# Patient Record
Sex: FEMALE | Race: BLACK OR AFRICAN AMERICAN | NOT HISPANIC OR LATINO
[De-identification: names, ages, dates, MRNs, and addresses within clinical notes are randomized per-mention and may not be internally consistent; named-entity substitution may affect disease eponyms.]

---

## 2017-06-26 ENCOUNTER — APPOINTMENT (OUTPATIENT)
Dept: OTHER | Facility: CLINIC | Age: 62
End: 2017-06-26

## 2017-06-26 VITALS
OXYGEN SATURATION: 95 % | HEIGHT: 68 IN | SYSTOLIC BLOOD PRESSURE: 130 MMHG | DIASTOLIC BLOOD PRESSURE: 82 MMHG | BODY MASS INDEX: 40.92 KG/M2 | WEIGHT: 270 LBS | HEART RATE: 74 BPM

## 2017-06-27 LAB
ALBUMIN SERPL ELPH-MCNC: 4.4 G/DL
ALP BLD-CCNC: 47 U/L
ALT SERPL-CCNC: 14 U/L
ANION GAP SERPL CALC-SCNC: 15 MMOL/L
APPEARANCE: CLEAR
AST SERPL-CCNC: 12 U/L
BASOPHILS # BLD AUTO: 0.04 K/UL
BASOPHILS NFR BLD AUTO: 0.8 %
BILIRUB SERPL-MCNC: 0.6 MG/DL
BILIRUBIN URINE: NEGATIVE
BLOOD URINE: NEGATIVE
BUN SERPL-MCNC: 12 MG/DL
CALCIUM SERPL-MCNC: 9.2 MG/DL
CHLORIDE SERPL-SCNC: 101 MMOL/L
CHOLEST SERPL-MCNC: 204 MG/DL
CHOLEST/HDLC SERPL: 3.4 RATIO
CO2 SERPL-SCNC: 25 MMOL/L
COLOR: YELLOW
CREAT SERPL-MCNC: 0.74 MG/DL
EOSINOPHIL # BLD AUTO: 0.12 K/UL
EOSINOPHIL NFR BLD AUTO: 2.3 %
GLUCOSE QUALITATIVE U: NORMAL MG/DL
GLUCOSE SERPL-MCNC: 114 MG/DL
HCT VFR BLD CALC: 39.9 %
HDLC SERPL-MCNC: 60 MG/DL
HGB BLD-MCNC: 12.3 G/DL
IMM GRANULOCYTES NFR BLD AUTO: 0.2 %
KETONES URINE: NEGATIVE
LDLC SERPL CALC-MCNC: 123 MG/DL
LEUKOCYTE ESTERASE URINE: NEGATIVE
LYMPHOCYTES # BLD AUTO: 1.95 K/UL
LYMPHOCYTES NFR BLD AUTO: 38.1 %
MAN DIFF?: NORMAL
MCHC RBC-ENTMCNC: 25.5 PG
MCHC RBC-ENTMCNC: 30.8 GM/DL
MCV RBC AUTO: 82.8 FL
MONOCYTES # BLD AUTO: 0.31 K/UL
MONOCYTES NFR BLD AUTO: 6.1 %
NEUTROPHILS # BLD AUTO: 2.69 K/UL
NEUTROPHILS NFR BLD AUTO: 52.5 %
NITRITE URINE: NEGATIVE
PH URINE: 5.5
PLATELET # BLD AUTO: 236 K/UL
POTASSIUM SERPL-SCNC: 4.4 MMOL/L
PROT SERPL-MCNC: 7 G/DL
PROTEIN URINE: NEGATIVE MG/DL
RBC # BLD: 4.82 M/UL
RBC # FLD: 15.3 %
SODIUM SERPL-SCNC: 141 MMOL/L
SPECIFIC GRAVITY URINE: 1.02
TRIGL SERPL-MCNC: 107 MG/DL
UROBILINOGEN URINE: NORMAL MG/DL
WBC # FLD AUTO: 5.12 K/UL

## 2018-06-05 ENCOUNTER — APPOINTMENT (OUTPATIENT)
Dept: OTHER | Facility: CLINIC | Age: 63
End: 2018-06-05
Payer: COMMERCIAL

## 2018-06-05 VITALS
WEIGHT: 270 LBS | HEART RATE: 88 BPM | SYSTOLIC BLOOD PRESSURE: 117 MMHG | RESPIRATION RATE: 16 BRPM | DIASTOLIC BLOOD PRESSURE: 83 MMHG | HEIGHT: 68 IN | BODY MASS INDEX: 40.92 KG/M2 | OXYGEN SATURATION: 95 %

## 2018-06-05 PROCEDURE — 99214 OFFICE O/P EST MOD 30 MIN: CPT | Mod: 25

## 2018-06-05 PROCEDURE — 96150: CPT

## 2018-06-05 PROCEDURE — 94010 BREATHING CAPACITY TEST: CPT

## 2018-06-05 PROCEDURE — 99396 PREV VISIT EST AGE 40-64: CPT

## 2018-06-06 LAB
ALBUMIN SERPL ELPH-MCNC: 4.3 G/DL
ALP BLD-CCNC: 51 U/L
ALT SERPL-CCNC: 12 U/L
ANION GAP SERPL CALC-SCNC: 15 MMOL/L
APPEARANCE: CLEAR
AST SERPL-CCNC: 12 U/L
BACTERIA: NEGATIVE
BASOPHILS # BLD AUTO: 0.04 K/UL
BASOPHILS NFR BLD AUTO: 0.6 %
BILIRUB SERPL-MCNC: 0.5 MG/DL
BILIRUBIN URINE: NEGATIVE
BLOOD URINE: NEGATIVE
BUN SERPL-MCNC: 12 MG/DL
CALCIUM SERPL-MCNC: 9.4 MG/DL
CHLORIDE SERPL-SCNC: 101 MMOL/L
CHOLEST SERPL-MCNC: 211 MG/DL
CHOLEST/HDLC SERPL: 4.1 RATIO
CO2 SERPL-SCNC: 25 MMOL/L
COLOR: YELLOW
CREAT SERPL-MCNC: 0.87 MG/DL
EOSINOPHIL # BLD AUTO: 0.09 K/UL
EOSINOPHIL NFR BLD AUTO: 1.3 %
GLUCOSE QUALITATIVE U: NEGATIVE MG/DL
GLUCOSE SERPL-MCNC: 111 MG/DL
HCT VFR BLD CALC: 40.7 %
HDLC SERPL-MCNC: 52 MG/DL
HGB BLD-MCNC: 11.9 G/DL
HYALINE CASTS: 0 /LPF
IMM GRANULOCYTES NFR BLD AUTO: 0.1 %
KETONES URINE: NEGATIVE
LDLC SERPL CALC-MCNC: 126 MG/DL
LEUKOCYTE ESTERASE URINE: NEGATIVE
LYMPHOCYTES # BLD AUTO: 2.47 K/UL
LYMPHOCYTES NFR BLD AUTO: 35.9 %
MAN DIFF?: NORMAL
MCHC RBC-ENTMCNC: 24.3 PG
MCHC RBC-ENTMCNC: 29.2 GM/DL
MCV RBC AUTO: 83.1 FL
MICROSCOPIC-UA: NORMAL
MONOCYTES # BLD AUTO: 0.46 K/UL
MONOCYTES NFR BLD AUTO: 6.7 %
NEUTROPHILS # BLD AUTO: 3.81 K/UL
NEUTROPHILS NFR BLD AUTO: 55.4 %
NITRITE URINE: NEGATIVE
PH URINE: 6
PLATELET # BLD AUTO: 268 K/UL
POTASSIUM SERPL-SCNC: 4.4 MMOL/L
PROT SERPL-MCNC: 7.2 G/DL
PROTEIN URINE: NEGATIVE MG/DL
RBC # BLD: 4.9 M/UL
RBC # FLD: 15.1 %
RED BLOOD CELLS URINE: 0 /HPF
SODIUM SERPL-SCNC: 141 MMOL/L
SPECIFIC GRAVITY URINE: 1.02
SQUAMOUS EPITHELIAL CELLS: 4 /HPF
TRIGL SERPL-MCNC: 164 MG/DL
UROBILINOGEN URINE: NEGATIVE MG/DL
WBC # FLD AUTO: 6.88 K/UL
WHITE BLOOD CELLS URINE: 1 /HPF

## 2018-06-08 ENCOUNTER — RX RENEWAL (OUTPATIENT)
Age: 63
End: 2018-06-08

## 2018-08-10 ENCOUNTER — RX RENEWAL (OUTPATIENT)
Age: 63
End: 2018-08-10

## 2018-08-14 ENCOUNTER — RX RENEWAL (OUTPATIENT)
Age: 63
End: 2018-08-14

## 2018-08-29 ENCOUNTER — TRANSCRIPTION ENCOUNTER (OUTPATIENT)
Age: 63
End: 2018-08-29

## 2018-10-01 ENCOUNTER — RX RENEWAL (OUTPATIENT)
Age: 63
End: 2018-10-01

## 2018-10-03 ENCOUNTER — RX RENEWAL (OUTPATIENT)
Age: 63
End: 2018-10-03

## 2018-10-12 ENCOUNTER — RX RENEWAL (OUTPATIENT)
Age: 63
End: 2018-10-12

## 2019-02-25 ENCOUNTER — RX RENEWAL (OUTPATIENT)
Age: 64
End: 2019-02-25

## 2019-04-24 ENCOUNTER — FORM ENCOUNTER (OUTPATIENT)
Age: 64
End: 2019-04-24

## 2019-05-14 ENCOUNTER — APPOINTMENT (OUTPATIENT)
Dept: PULMONOLOGY | Facility: CLINIC | Age: 64
End: 2019-05-14

## 2019-06-18 ENCOUNTER — APPOINTMENT (OUTPATIENT)
Dept: PULMONOLOGY | Facility: CLINIC | Age: 64
End: 2019-06-18
Payer: COMMERCIAL

## 2019-06-18 VITALS
BODY MASS INDEX: 39.56 KG/M2 | DIASTOLIC BLOOD PRESSURE: 85 MMHG | HEIGHT: 68 IN | SYSTOLIC BLOOD PRESSURE: 132 MMHG | OXYGEN SATURATION: 95 % | WEIGHT: 261 LBS | TEMPERATURE: 98.3 F | HEART RATE: 76 BPM

## 2019-06-18 PROCEDURE — 99203 OFFICE O/P NEW LOW 30 MIN: CPT | Mod: 25

## 2019-06-18 PROCEDURE — 94729 DIFFUSING CAPACITY: CPT

## 2019-06-18 PROCEDURE — 94726 PLETHYSMOGRAPHY LUNG VOLUMES: CPT

## 2019-06-18 PROCEDURE — ZZZZZ: CPT

## 2019-06-18 PROCEDURE — 94010 BREATHING CAPACITY TEST: CPT

## 2019-06-18 NOTE — PHYSICAL EXAM
[General Appearance - Well Developed] : well developed [Normal Appearance] : normal appearance [Well Groomed] : well groomed [General Appearance - Well Nourished] : well nourished [No Deformities] : no deformities [General Appearance - In No Acute Distress] : no acute distress [Normal Conjunctiva] : the conjunctiva exhibited no abnormalities [Normal Oropharynx] : normal oropharynx [Neck Appearance] : the appearance of the neck was normal [Neck Cervical Mass (___cm)] : no neck mass was observed [Jugular Venous Distention Increased] : there was no jugular-venous distention [Heart Rate And Rhythm] : heart rate and rhythm were normal [Heart Sounds] : normal S1 and S2 [Murmurs] : no murmurs present [Edema] : no peripheral edema present [Arterial Pulses Normal] : the arterial pulses were normal [Respiration, Rhythm And Depth] : normal respiratory rhythm and effort [Exaggerated Use Of Accessory Muscles For Inspiration] : no accessory muscle use [Auscultation Breath Sounds / Voice Sounds] : lungs were clear to auscultation bilaterally [Bowel Sounds] : normal bowel sounds [Abdomen Soft] : soft [Abdomen Tenderness] : non-tender [Abnormal Walk] : normal gait [] : no hepato-splenomegaly [Nail Clubbing] : no clubbing of the fingernails [Cyanosis, Localized] : no localized cyanosis [Skin Color & Pigmentation] : normal skin color and pigmentation [Skin Turgor] : normal skin turgor [No Focal Deficits] : no focal deficits [Oriented To Time, Place, And Person] : oriented to person, place, and time [Impaired Insight] : insight and judgment were intact

## 2019-06-18 NOTE — PROCEDURE
[FreeTextEntry1] : PFTs- Spirometry shows possible small airway disease which is a nonspecific finding. Lung volumes are normal. DLCO is normal. No sig change compared to complete PFTs from 2014.

## 2019-06-24 ENCOUNTER — APPOINTMENT (OUTPATIENT)
Dept: OTHER | Facility: CLINIC | Age: 64
End: 2019-06-24
Payer: COMMERCIAL

## 2019-06-24 VITALS
SYSTOLIC BLOOD PRESSURE: 135 MMHG | HEART RATE: 100 BPM | RESPIRATION RATE: 18 BRPM | TEMPERATURE: 97.5 F | HEIGHT: 68 IN | OXYGEN SATURATION: 96 % | BODY MASS INDEX: 40.01 KG/M2 | WEIGHT: 264 LBS | DIASTOLIC BLOOD PRESSURE: 78 MMHG

## 2019-06-24 PROCEDURE — 99396 PREV VISIT EST AGE 40-64: CPT | Mod: 25

## 2019-06-24 PROCEDURE — 99214 OFFICE O/P EST MOD 30 MIN: CPT | Mod: 25

## 2019-06-24 NOTE — HISTORY OF PRESENT ILLNESS
[FreeTextEntry1] : 62 yo female, retired from [employer], works as massage therapist \par \par \par  she uses maintenance and rescue inhaler\par  also needs to take nasal sprays and antihistamine to control allergic  rhinitis  that exacerbated asthma Sx \par \par \par NO ER visits in the past 10 years\par She was seeing Dr Rodriguez Pulmonologist since before 09 11 2001 \par  recently evaluated by Dr Frazier 06 18 2019\par had PFT- mild restriction \par her cough and wheezing is better \par used  Xopenex HFA 3 times in the past few months, it does not cause her to be jittery \par Pulmicort Flexhaler once a day \par Interval Symptoms: denies lower extremity edema, denies chest pain, denies fever, but stable dyspnea on exertion, stable exercise intolerance, stable coughing, stable wheezing. \par \par \par \par Interval Triggers: cold weather and pollen exposure, infections, animal dander. \par \par She does snores loudly at night but denies nighttime awakening, denies being sleepy during daytime.\par She denies GERD, triggers such as pets, roaches, mold. \par \par \par She quit smoking 20 years ago, smoked 15-17 years, about 0.5 PPD. She currently works as a massage therapist.\par \par Colonoscopy age 54 yo, next due at age 62 yo- will refer \par Mammogram she reports being up to date  \par Pap smear- reported that  had it done this year by her GYN \par \par Spirometry : restriction, no change  \par Exposure History \par WTC effort on 9/13/2001-05 2002 12 hours a day assigned to the [employer]. 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 She was stationed [facility] THE [facility] AND [facility]. \par \par

## 2019-06-24 NOTE — DISCUSSION/SUMMARY
[Patient seen for WTC Monitoring ___] : Patient was seen for WTC monitoring [unfilled] [Please See Note in Chart and Documentation in Trial DB] : Please see note in chart and documentation in Trial DB. [FreeTextEntry3] : 62 yo female, retired from [place] \par \par \par \par She quit smoking 20  years ago, smoked 15-17 years, about 0.5 PPD). She currently works as a massage therapist.\par \par Colonoscopy age 54 yo \par Mammogram scheduled next month \par Pap smear had it done as per pt report \par \par \par Ms. Disla states that she began on the [facility] effort on 9/13/2001-05 2002 12 hours a day she was assigned to the [employer]. 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 She was stationed [facility]AC[facility] TO THE [facility] AND [facility]. \par \par ROS in trial DB \par PE in Trial DB \par Spirometry: Restriction, mild on last PFT \par A/P: Annual [facility] MV7\par CXR referral \par  labs  [FreeTextEntry1] : Patient seen face to face by SW for 15 minutes. Patient is here for her annual monitoring. Patient is certified with asthma. [facility] reviewed and data entered in TRIAL DB. Patient denied symptoms of PTSD, anxiety and depression related to her 9/11 efforts. Patient denied having a mental health history. No evidence of risk. SW did provide benefits counseling. Patient knows to call here with questions and concerns. Contact card provided.

## 2019-06-24 NOTE — HISTORY OF PRESENT ILLNESS
[FreeTextEntry1] : 64 yo female, retired from [employer], works as massage therapist \par \par \par  she uses maintenance and rescue inhaler\par  also needs to take nasal sprays and antihistamine to control allergic  rhinitis  that exacerbated asthma Sx \par \par \par NO ER visits in the past 10 years\par She was seeing Dr Rodriguez Pulmonologist since before 09 11 2001 \par  recently evaluated by Dr Frazier 06 18 2019\par had PFT- mild restriction \par her cough and wheezing is better \par used  Xopenex HFA 3 times in the past few months, it does not cause her to be jittery \par Pulmicort Flexhaler once a day \par Interval Symptoms: denies lower extremity edema, denies chest pain, denies fever, but stable dyspnea on exertion, stable exercise intolerance, stable coughing, stable wheezing. \par \par \par \par Interval Triggers: cold weather and pollen exposure, infections, animal dander. \par \par She does snores loudly at night but denies nighttime awakening, denies being sleepy during daytime.\par She denies GERD, triggers such as pets, roaches, mold. \par \par \par She quit smoking 20 years ago, smoked 15-17 years, about 0.5 PPD. She currently works as a massage therapist.\par \par Colonoscopy age 52 yo, next due at age 64 yo- will refer \par Mammogram she reports being up to date  \par Pap smear- reported that  had it done this year by her GYN \par \par Spirometry : restriction, no change  \par Exposure History \par WTC effort on 9/13/2001-05 2002 12 hours a day assigned to the [employer]. 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 She was stationed [facility] THE [facility] AND [facility]. \par \par

## 2019-06-24 NOTE — REASON FOR VISIT
[Follow-Up] : a follow-up visit [FreeTextEntry1] : asthma certified by [employer] as WTC related/ exacerbated

## 2019-06-24 NOTE — REASON FOR VISIT
[FreeTextEntry1] : asthma certified by [employer] as WTC related/ exacerbated  [Follow-Up] : a follow-up visit

## 2019-06-24 NOTE — ASSESSMENT
[FreeTextEntry1] : mild persistent  asthma with seasonal execrations due to allergic rhinitis \par  using maintenance inhaler \par  Sx improved \par  f/up in 6 months

## 2019-06-24 NOTE — PHYSICAL EXAM
[General Appearance - Alert] : alert [General Appearance - In No Acute Distress] : in no acute distress [FreeTextEntry1] : Obese [Sclera] : the sclera and conjunctiva were normal [PERRL With Normal Accommodation] : pupils were equal in size, round, and reactive to light [Extraocular Movements] : extraocular movements were intact [Outer Ear] : the ears and nose were normal in appearance [Oropharynx] : the oropharynx was normal [Neck Appearance] : the appearance of the neck was normal [Neck Cervical Mass (___cm)] : no neck mass was observed [Jugular Venous Distention Increased] : there was no jugular-venous distention [Thyroid Diffuse Enlargement] : the thyroid was not enlarged [Thyroid Nodule] : there were no palpable thyroid nodules [Auscultation Breath Sounds / Voice Sounds] : lungs were clear to auscultation bilaterally [Heart Rate And Rhythm] : heart rate was normal and rhythm regular [Heart Sounds] : normal S1 and S2 [Heart Sounds Gallop] : no gallops [Murmurs] : no murmurs [Heart Sounds Pericardial Friction Rub] : no pericardial rub [Bowel Sounds] : normal bowel sounds [Abdomen Soft] : soft [Abdomen Tenderness] : non-tender [] : no hepato-splenomegaly [Abdomen Mass (___ Cm)] : no abdominal mass palpated

## 2019-06-24 NOTE — DISCUSSION/SUMMARY
[Patient seen for WTC Monitoring ___] : Patient was seen for WTC monitoring [unfilled] [Please See Note in Chart and Documentation in Trial DB] : Please see note in chart and documentation in Trial DB. [FreeTextEntry3] : 64 yo female, retired from [place] \par \par \par \par She quit smoking 20  years ago, smoked 15-17 years, about 0.5 PPD). She currently works as a massage therapist.\par \par Colonoscopy age 52 yo \par Mammogram scheduled next month \par Pap smear had it done as per pt report \par \par \par Ms. Disla states that she began on the [facility] effort on 9/13/2001-05 2002 12 hours a day she was assigned to the [employer]. 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 She was stationed [facility] THE [facility] AND [facility]. \par \par ROS in trial DB \par PE in Trial DB \par Spirometry: Restriction, mild on last PFT \par A/P: Annual [facility] MV7\par CXR referral \par  labs  [FreeTextEntry1] : Patient seen face to face by SW for 15 minutes. Patient is here for her annual monitoring. Patient is certified with asthma. [facility] reviewed and data entered in TRIAL DB. Patient denied symptoms of PTSD, anxiety and depression related to her 9/11 efforts. Patient denied having a mental health history. No evidence of risk. SW did provide benefits counseling. Patient knows to call here with questions and concerns. Contact card provided.

## 2019-06-25 LAB
ALBUMIN SERPL ELPH-MCNC: 4.4 G/DL
ALP BLD-CCNC: 51 U/L
ALT SERPL-CCNC: 13 U/L
ANION GAP SERPL CALC-SCNC: 15 MMOL/L
APPEARANCE: CLEAR
AST SERPL-CCNC: 11 U/L
BACTERIA: NEGATIVE
BASOPHILS # BLD AUTO: 0.06 K/UL
BASOPHILS NFR BLD AUTO: 0.9 %
BILIRUB SERPL-MCNC: 0.4 MG/DL
BILIRUBIN URINE: NEGATIVE
BLOOD URINE: NEGATIVE
BUN SERPL-MCNC: 11 MG/DL
CALCIUM SERPL-MCNC: 9.4 MG/DL
CHLORIDE SERPL-SCNC: 103 MMOL/L
CHOLEST SERPL-MCNC: 225 MG/DL
CHOLEST/HDLC SERPL: 4.3 RATIO
CO2 SERPL-SCNC: 24 MMOL/L
COLOR: YELLOW
CREAT SERPL-MCNC: 0.78 MG/DL
EOSINOPHIL # BLD AUTO: 0.1 K/UL
EOSINOPHIL NFR BLD AUTO: 1.5 %
GLUCOSE QUALITATIVE U: NEGATIVE
GLUCOSE SERPL-MCNC: 107 MG/DL
HCT VFR BLD CALC: 42 %
HDLC SERPL-MCNC: 53 MG/DL
HGB BLD-MCNC: 12.7 G/DL
HYALINE CASTS: 2 /LPF
IMM GRANULOCYTES NFR BLD AUTO: 0.1 %
KETONES URINE: NEGATIVE
LDLC SERPL CALC-MCNC: 135 MG/DL
LEUKOCYTE ESTERASE URINE: NEGATIVE
LYMPHOCYTES # BLD AUTO: 2.34 K/UL
LYMPHOCYTES NFR BLD AUTO: 34.1 %
MAN DIFF?: NORMAL
MCHC RBC-ENTMCNC: 25 PG
MCHC RBC-ENTMCNC: 30.2 GM/DL
MCV RBC AUTO: 82.8 FL
MICROSCOPIC-UA: NORMAL
MONOCYTES # BLD AUTO: 0.41 K/UL
MONOCYTES NFR BLD AUTO: 6 %
NEUTROPHILS # BLD AUTO: 3.94 K/UL
NEUTROPHILS NFR BLD AUTO: 57.4 %
NITRITE URINE: NEGATIVE
PH URINE: 5.5
PLATELET # BLD AUTO: 260 K/UL
POTASSIUM SERPL-SCNC: 4.4 MMOL/L
PROT SERPL-MCNC: 7.1 G/DL
PROTEIN URINE: NEGATIVE
RBC # BLD: 5.07 M/UL
RBC # FLD: 14.6 %
RED BLOOD CELLS URINE: 3 /HPF
SODIUM SERPL-SCNC: 142 MMOL/L
SPECIFIC GRAVITY URINE: 1.02
SQUAMOUS EPITHELIAL CELLS: 3 /HPF
TRIGL SERPL-MCNC: 186 MG/DL
UROBILINOGEN URINE: NORMAL
WBC # FLD AUTO: 6.86 K/UL
WHITE BLOOD CELLS URINE: 2 /HPF

## 2019-07-30 ENCOUNTER — APPOINTMENT (OUTPATIENT)
Dept: GASTROENTEROLOGY | Facility: CLINIC | Age: 64
End: 2019-07-30
Payer: COMMERCIAL

## 2019-07-30 VITALS
TEMPERATURE: 98.1 F | BODY MASS INDEX: 40.62 KG/M2 | HEIGHT: 68 IN | HEART RATE: 88 BPM | DIASTOLIC BLOOD PRESSURE: 70 MMHG | RESPIRATION RATE: 16 BRPM | SYSTOLIC BLOOD PRESSURE: 130 MMHG | WEIGHT: 268 LBS | OXYGEN SATURATION: 94 %

## 2019-07-30 PROCEDURE — 99204 OFFICE O/P NEW MOD 45 MIN: CPT

## 2019-07-30 NOTE — PHYSICAL EXAM
[General Appearance - Alert] : alert [Sclera] : the sclera and conjunctiva were normal [General Appearance - In No Acute Distress] : in no acute distress [PERRL With Normal Accommodation] : pupils were equal in size, round, and reactive to light [Extraocular Movements] : extraocular movements were intact [Outer Ear] : the ears and nose were normal in appearance [Oropharynx] : the oropharynx was normal [Neck Appearance] : the appearance of the neck was normal [Neck Cervical Mass (___cm)] : no neck mass was observed [Jugular Venous Distention Increased] : there was no jugular-venous distention [Thyroid Diffuse Enlargement] : the thyroid was not enlarged [Thyroid Nodule] : there were no palpable thyroid nodules [Auscultation Breath Sounds / Voice Sounds] : lungs were clear to auscultation bilaterally [Heart Rate And Rhythm] : heart rate was normal and rhythm regular [Heart Sounds] : normal S1 and S2 [Murmurs] : no murmurs [Heart Sounds Gallop] : no gallops [Heart Sounds Pericardial Friction Rub] : no pericardial rub [Edema] : there was no peripheral edema [Bowel Sounds] : normal bowel sounds [Abdomen Soft] : soft [Abdomen Tenderness] : non-tender [Abdomen Mass (___ Cm)] : no abdominal mass palpated [Cervical Lymph Nodes Enlarged Posterior Bilaterally] : posterior cervical [Cervical Lymph Nodes Enlarged Anterior Bilaterally] : anterior cervical [Supraclavicular Lymph Nodes Enlarged Bilaterally] : supraclavicular [Axillary Lymph Nodes Enlarged Bilaterally] : axillary [Femoral Lymph Nodes Enlarged Bilaterally] : femoral [Inguinal Lymph Nodes Enlarged Bilaterally] : inguinal [No CVA Tenderness] : no ~M costovertebral angle tenderness [No Spinal Tenderness] : no spinal tenderness [Abnormal Walk] : normal gait [Nail Clubbing] : no clubbing  or cyanosis of the fingernails [Skin Color & Pigmentation] : normal skin color and pigmentation [Musculoskeletal - Swelling] : no joint swelling seen [Motor Tone] : muscle strength and tone were normal [] : no rash [Skin Turgor] : normal skin turgor [Impaired Insight] : insight and judgment were intact [Oriented To Time, Place, And Person] : oriented to person, place, and time [Affect] : the affect was normal

## 2019-07-30 NOTE — HISTORY OF PRESENT ILLNESS
[de-identified] : 63-year-old female presents for evaluation prior to screening colonoscopy\par Last examination 10 years ago without polypectomy\par No significant interval complaints\par No family history of colon cancer among first-degree relatives, paternal uncle with colon cancer in his 70s\par \par Patient denies any difficulty swallowing or reflux complaints\par \par Social history: Works as a massage therapist, retired [place]

## 2019-07-30 NOTE — ASSESSMENT
[FreeTextEntry1] : 63-year-old female average risk for colon cancer and polyps\par \par Plan\par Indications risks benefits and alternatives to colonoscopy reviewed with patient\par She is agreeable to examination\par \par \par Patient referred by Dr. Luma Patel

## 2019-09-18 ENCOUNTER — APPOINTMENT (OUTPATIENT)
Dept: GASTROENTEROLOGY | Facility: AMBULATORY MEDICAL SERVICES | Age: 64
End: 2019-09-18
Payer: COMMERCIAL

## 2019-09-18 PROCEDURE — 45378 DIAGNOSTIC COLONOSCOPY: CPT

## 2019-09-25 ENCOUNTER — MEDICATION RENEWAL (OUTPATIENT)
Age: 64
End: 2019-09-25

## 2020-07-27 ENCOUNTER — APPOINTMENT (OUTPATIENT)
Dept: OTHER | Facility: CLINIC | Age: 65
End: 2020-07-27
Payer: COMMERCIAL

## 2020-07-27 PROCEDURE — 99396 PREV VISIT EST AGE 40-64: CPT | Mod: 95

## 2020-07-27 PROCEDURE — 99442: CPT | Mod: 95

## 2020-07-27 RX ORDER — SODIUM SULFATE, POTASSIUM SULFATE, MAGNESIUM SULFATE 17.5; 3.13; 1.6 G/ML; G/ML; G/ML
17.5-3.13-1.6 SOLUTION, CONCENTRATE ORAL
Qty: 1 | Refills: 0 | Status: DISCONTINUED | COMMUNITY
Start: 2019-07-30 | End: 2020-07-27

## 2020-07-27 NOTE — ASSESSMENT
[FreeTextEntry1] : mild persistent  asthma \par  using maintenance inhaler \par  Sx improved \par  we had discussion re testing ofr ANDREA - she declined at this time \par  f/up in 6 months

## 2020-07-27 NOTE — HISTORY OF PRESENT ILLNESS
[Other Location: e.g. Home (Enter Location, City,State)___] : at [unfilled] [Home] : at home, [unfilled] , at the time of the visit. [Verbal consent obtained from patient] : the patient, [unfilled] [FreeTextEntry1] : using maintenance inhaler once a day\par  very occasional use of rescue inhaler \par  once a months during whit time and twice a months at [place] when wakes up with ough \par  has snoring \par  but no EDS or apnea reported\par  no heartburn \par NO ER visits in the past 10 years\par She was seeing Dr Rodriguez Pulmonologist in the past \par evaluated by Dr Frazier 06 18 2019\par had PFT- mild restriction \par her cough and wheezing is better with use of maintenance inhaler \par  Xopenex HFA does not cause her to be jittery \par Pulmicort Flexhaler once a day \par Interval Symptoms: denies lower extremity edema, denies chest pain, denies fever, but stable dyspnea on exertion, stable exercise intolerance, stable coughing, stable wheezing. \par \par \par \par Interval Triggers: cold weather and pollen exposure, infections, animal dander. \par \par She does snores loudly at night but denies nighttime awakening, denies being sleepy during daytime.\par She denies GERD, triggers such as pets, roaches, mold. \par \par \par \par \par Spirometry : restriction, no change \par \par \par

## 2020-07-27 NOTE — DISCUSSION/SUMMARY
[Home] : at home, [unfilled] , at the time of the visit. [Other Location: e.g. Home (Enter Location, City,State)___] : at [unfilled] [Verbal consent obtained from patient] : the patient, [unfilled] [Patient seen for WTC Monitoring ___] : Patient was seen for WTC monitoring [unfilled] [Please See Note in Chart and Documentation in Trial DB] : Please see note in chart and documentation in Trial DB. [FreeTextEntry3] : 65 yo female, retired from [employer] \par \par \par \par She quit smoking over 20  years ago, smoked 15-17 years, about 0.5 PPD). She currently works as a massage therapist.\par \par Colonoscopy age 64 yo \par \par \par \par Ms. Disla states that she began on the [facility] effort on 9/13/2001-05 2002 12 hours a day she was assigned to the [employer]. 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 She was stationed [facility] TO THE [facility] AND [facility]. \par \par ROS in trial DB \par PE deferred \par Spirometry: deferred \par A/P: Annual [facility] MV8\par CXR and \par  labs deferred \par  see Occ MEd follow up note  [FreeTextEntry1] : Patient seen face to face by SW for 15 minutes. Patient is here for her annual monitoring. Patient is certified with asthma. [facility] reviewed and data entered in TRIAL DB. Patient denied symptoms of PTSD, anxiety and depression related to her 9/11 efforts. Patient denied having a mental health history. No evidence of risk. SW did provide benefits counseling. Patient knows to call here with questions and concerns. Contact card provided.

## 2020-07-27 NOTE — DISCUSSION/SUMMARY
[Home] : at home, [unfilled] , at the time of the visit. [Other Location: e.g. Home (Enter Location, City,State)___] : at [unfilled] [Verbal consent obtained from patient] : the patient, [unfilled] [Patient seen for WTC Monitoring ___] : Patient was seen for WTC monitoring [unfilled] [Please See Note in Chart and Documentation in Trial DB] : Please see note in chart and documentation in Trial DB. [FreeTextEntry3] : 63 yo female, retired from [employer] \par \par \par \par She quit smoking over 20  years ago, smoked 15-17 years, about 0.5 PPD). She currently works as a massage therapist.\par \par Colonoscopy age 62 yo \par \par \par \par Ms. Disla states that she began on the [facility] effort on 9/13/2001-05 2002 12 hours a day she was assigned to the [employer]. 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 She was stationed [facility]JAC[facility] TO THE [facility] AND [facility]. \par \par ROS in trial DB \par PE deferred \par Spirometry: deferred \par A/P: Annual WTC MV8\par CXR and \par  labs deferred \par  see Occ MEd follow up note  [FreeTextEntry1] : Patient seen face to face by SW for 15 minutes. Patient is here for her annual monitoring. Patient is certified with asthma. [facility] reviewed and data entered in TRIAL DB. Patient denied symptoms of PTSD, anxiety and depression related to her 9/11 efforts. Patient denied having a mental health history. No evidence of risk. SW did provide benefits counseling. Patient knows to call here with questions and concerns. Contact card provided.

## 2020-07-27 NOTE — PAST MEDICAL HISTORY
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 She was stationed [facility] TO THE hospitals AND [facility].

## 2020-07-27 NOTE — PAST MEDICAL HISTORY
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 She was stationed [facility]JAC[facility] TO THE [facility] AND [facility].

## 2020-07-27 NOTE — HEALTH RISK ASSESSMENT
[Patient reported PAP Smear was normal] : Patient reported PAP Smear was normal [Patient reported mammogram was normal] : Patient reported mammogram was normal [Patient reported colonoscopy was normal] : Patient reported colonoscopy was normal [MammogramDate] : 9/2019 [PapSmearDate] : 2019 [ColonoscopyDate] : 2019

## 2020-07-27 NOTE — HEALTH RISK ASSESSMENT
[Patient reported mammogram was normal] : Patient reported mammogram was normal [Patient reported PAP Smear was normal] : Patient reported PAP Smear was normal [Patient reported colonoscopy was normal] : Patient reported colonoscopy was normal [MammogramDate] : 9/2019 [PapSmearDate] : 2019 [ColonoscopyDate] : 2019

## 2021-01-25 ENCOUNTER — RX RENEWAL (OUTPATIENT)
Age: 66
End: 2021-01-25

## 2021-05-19 ENCOUNTER — RX RENEWAL (OUTPATIENT)
Age: 66
End: 2021-05-19

## 2021-07-19 ENCOUNTER — LABORATORY RESULT (OUTPATIENT)
Age: 66
End: 2021-07-19

## 2021-07-19 ENCOUNTER — APPOINTMENT (OUTPATIENT)
Dept: OTHER | Facility: CLINIC | Age: 66
End: 2021-07-19
Payer: COMMERCIAL

## 2021-07-19 VITALS
SYSTOLIC BLOOD PRESSURE: 130 MMHG | HEART RATE: 102 BPM | WEIGHT: 254 LBS | HEIGHT: 68 IN | RESPIRATION RATE: 18 BRPM | BODY MASS INDEX: 38.49 KG/M2 | OXYGEN SATURATION: 95 % | DIASTOLIC BLOOD PRESSURE: 80 MMHG | TEMPERATURE: 97.4 F

## 2021-07-19 PROCEDURE — 99396 PREV VISIT EST AGE 40-64: CPT | Mod: 25

## 2021-07-19 PROCEDURE — 99213 OFFICE O/P EST LOW 20 MIN: CPT | Mod: 25

## 2021-07-19 RX ORDER — BUDESONIDE AND FORMOTEROL FUMARATE DIHYDRATE 160; 4.5 UG/1; UG/1
160-4.5 AEROSOL RESPIRATORY (INHALATION) TWICE DAILY
Qty: 1 | Refills: 1 | Status: DISCONTINUED | COMMUNITY
Start: 2021-07-19 | End: 2021-07-19

## 2021-07-19 NOTE — DISCUSSION/SUMMARY
[Patient seen for WTC Monitoring ___] : Patient was seen for WTC monitoring [unfilled] [Please See Note in Chart and Documentation in Trial DB] : Please see note in chart and documentation in Trial DB. [FreeTextEntry1] : Patient seen face to face by SW for 15 minutes. Patient is here for her annual monitoring. Patient is certified with asthma. [facility] reviewed and data entered in TRIAL DB. Patient denied symptoms of PTSD, anxiety and depression related to her 9/11 efforts. Patient denied having a mental health history. No evidence of risk. SW did provide benefits counseling. Patient knows to call here with questions and concerns. Contact card provided.  [FreeTextEntry3] : 64 yo female, retired from [employer] \par \par \par \par She quit smoking over 20  years ago, smoked 15-17 years, about 0.5 PPD). She currently works as a massage therapist.\par \par \par \par \par Ms. Disla states that she began on the [facility] effort on 9/13/2001-05 2002 12 hours a day she was assigned to the [employer]. 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 She was stationed ADJACENT TO THE \[facility]"" AND S[facility]. \par \par ROS in trial DB \par PE: in trial DB \par Spirometry: deferred \par A/P: Annual [facility]TC MV9\par CXR and \par  labs  ordered \par  see Occ MEd follow up note \par  mammogram ordered \par  rec to see PCP for ECG_ she is tachycardic \par  i will check TSH

## 2021-07-19 NOTE — PAST MEDICAL HISTORY
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 She was stationed [facility] TO THE [facility] AND [facility].

## 2021-07-19 NOTE — HISTORY OF PRESENT ILLNESS
[FreeTextEntry1] : using maintenance inhaler once a day 2 puffs - Pulmicort \par since 3 months ago noticed that asthma Sx are more often \par  coughing more\par  using of rescue inhaler 3-4 times per week with improvement of cough\par  waking up at night 3 times per month to use rescue inhaler \par  no wheezing \par  SOB only when exposed to very hot and humid conditions \par  no CP \par no allergy Sx\par no snoring \par  no heartburn \par NO ER visits in the past 10 years\par She was seeing Dr Rodriguez Pulmonologist in the past \par evaluated by Dr Frazier 06 18 2019\par had PFT- mild restriction \par \par  Xopenex HFA does not cause her to be jittery \par Pulmicort Flexhaler once a day \par \par \par \par \par \par \par \par \par Spirometry : deferred \par \par \par

## 2021-07-19 NOTE — PAST MEDICAL HISTORY
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 She was stationed [facility]JAC[facility] TO THE [facility] AND S[facility].

## 2021-07-19 NOTE — HEALTH RISK ASSESSMENT
[Patient reported mammogram was normal] : Patient reported mammogram was normal [Patient reported PAP Smear was normal] : Patient reported PAP Smear was normal [Patient reported colonoscopy was normal] : Patient reported colonoscopy was normal [MammogramDate] : 9/2018 [PapSmearDate] : 2021 [ColonoscopyDate] : 2019

## 2021-07-19 NOTE — ASSESSMENT
[FreeTextEntry1] : asthma- not well controlled \par \par \par inc  maintenance inhaler Pulmicort 90 mcg 2 puffs BID\par \par  She sill use Nasonex for nasal congestion - but her Sx are mild \par \par  refer for CXR \par  check allergy profile \par  pulm f/up  referred \par

## 2021-07-20 LAB
ALBUMIN SERPL ELPH-MCNC: 4.5 G/DL
ALP BLD-CCNC: 65 U/L
ALT SERPL-CCNC: 12 U/L
ANION GAP SERPL CALC-SCNC: 18 MMOL/L
APPEARANCE: CLEAR
AST SERPL-CCNC: 12 U/L
BACTERIA: NEGATIVE
BASOPHILS # BLD AUTO: 0.06 K/UL
BASOPHILS NFR BLD AUTO: 0.9 %
BILIRUB SERPL-MCNC: 0.5 MG/DL
BILIRUBIN URINE: NEGATIVE
BLOOD URINE: NEGATIVE
BUN SERPL-MCNC: 11 MG/DL
CALCIUM SERPL-MCNC: 9.6 MG/DL
CHLORIDE SERPL-SCNC: 96 MMOL/L
CHOLEST SERPL-MCNC: 251 MG/DL
CO2 SERPL-SCNC: 21 MMOL/L
COLOR: NORMAL
CREAT SERPL-MCNC: 0.75 MG/DL
EOSINOPHIL # BLD AUTO: 0.07 K/UL
EOSINOPHIL NFR BLD AUTO: 1.1 %
GLUCOSE QUALITATIVE U: ABNORMAL
GLUCOSE SERPL-MCNC: 390 MG/DL
HCT VFR BLD CALC: 44 %
HDLC SERPL-MCNC: 60 MG/DL
HGB BLD-MCNC: 13.3 G/DL
HYALINE CASTS: 0 /LPF
IMM GRANULOCYTES NFR BLD AUTO: 0.2 %
KETONES URINE: NEGATIVE
LDLC SERPL CALC-MCNC: 152 MG/DL
LEUKOCYTE ESTERASE URINE: NEGATIVE
LYMPHOCYTES # BLD AUTO: 2.56 K/UL
LYMPHOCYTES NFR BLD AUTO: 38.8 %
MAN DIFF?: NORMAL
MCHC RBC-ENTMCNC: 25 PG
MCHC RBC-ENTMCNC: 30.2 GM/DL
MCV RBC AUTO: 82.7 FL
MICROSCOPIC-UA: NORMAL
MONOCYTES # BLD AUTO: 0.31 K/UL
MONOCYTES NFR BLD AUTO: 4.7 %
NEUTROPHILS # BLD AUTO: 3.58 K/UL
NEUTROPHILS NFR BLD AUTO: 54.3 %
NITRITE URINE: NEGATIVE
NONHDLC SERPL-MCNC: 191 MG/DL
PH URINE: 5.5
PLATELET # BLD AUTO: 244 K/UL
POTASSIUM SERPL-SCNC: 4.4 MMOL/L
PROT SERPL-MCNC: 7.1 G/DL
PROTEIN URINE: NEGATIVE
RBC # BLD: 5.32 M/UL
RBC # FLD: 14.5 %
RED BLOOD CELLS URINE: 3 /HPF
SODIUM SERPL-SCNC: 135 MMOL/L
SPECIFIC GRAVITY URINE: 1.03
SQUAMOUS EPITHELIAL CELLS: 1 /HPF
TRIGL SERPL-MCNC: 194 MG/DL
TSH SERPL-ACNC: 1.72 UIU/ML
UROBILINOGEN URINE: NORMAL
WBC # FLD AUTO: 6.59 K/UL
WHITE BLOOD CELLS URINE: 0 /HPF

## 2021-07-22 LAB
BERMUDA GRASS IGE QN: 0.26 KUA/L
BOXELDER IGE QN: 4.99 KUA/L
CAT DANDER IGE QN: <0.1 KUA/L
CMN PIGWEED IGE QN: 1.58 KUA/L
COMMON RAGWEED IGE QN: 2.54 KUA/L
COTTONWOOD IGE QN: 1.12 KUA/L
D FARINAE IGE QN: 0.12 KUA/L
DEPRECATED BERMUDA GRASS IGE RAST QL: NORMAL
DEPRECATED BOXELDER IGE RAST QL: 3
DEPRECATED CAT DANDER IGE RAST QL: 0
DEPRECATED COMMON PIGWEED IGE RAST QL: 2
DEPRECATED COMMON RAGWEED IGE RAST QL: 2
DEPRECATED COTTONWOOD IGE RAST QL: 2
DEPRECATED D FARINAE IGE RAST QL: NORMAL
DEPRECATED DOG DANDER IGE RAST QL: 1
DEPRECATED ROACH IGE RAST QL: 0
DEPRECATED SILVER BIRCH IGE RAST QL: 4
DOG DANDER IGE QN: 0.68 KUA/L
ROACH IGE QN: <0.1 KUA/L
SILVER BIRCH IGE QN: 18.9 KUA/L

## 2021-07-26 LAB
A ALTERNATA IGE QN: 4.14 KUA/L
A FUMIGATUS IGE QN: 2.22 KUA/L
C HERBARUM IGE QN: 2.95 KUA/L
D PTERONYSS IGE QN: 0.3 KUA/L
DEPRECATED A ALTERNATA IGE RAST QL: 3
DEPRECATED A FUMIGATUS IGE RAST QL: 2
DEPRECATED C HERBARUM IGE RAST QL: 2
DEPRECATED D PTERONYSS IGE RAST QL: NORMAL
DEPRECATED LONDON PLANE IGE RAST QL: 3
DEPRECATED MUGWORT IGE RAST QL: NORMAL
DEPRECATED P NOTATUM IGE RAST QL: 2
DEPRECATED RED CEDAR IGE RAST QL: NORMAL
DEPRECATED SHEEP SORREL IGE RAST QL: NORMAL
DEPRECATED TIMOTHY IGE RAST QL: 1
DEPRECATED WALNUT IGE RAST QL: 0
DEPRECATED WHITE ASH IGE RAST QL: NORMAL
DEPRECATED WHITE OAK IGE RAST QL: 4
LONDON PLANE IGE QN: 6.92 KUA/L
MUGWORT IGE QN: 0.33 KUA/L
MULBERRY (T70) CLASS: 0
MULBERRY (T70) CONC: <0.1 KUA/L
P NOTATUM IGE QN: 1.14 KUA/L
RED CEDAR IGE QN: 0.28 KUA/L
SHEEP SORREL IGE QN: 0.19 KUA/L
TIMOTHY IGE QN: 0.57 KUA/L
WALNUT IGE QN: <0.1 KUA/L
WHITE ASH IGE QN: 0.26 KUA/L
WHITE ELM IGE QN: 0.83 KUA/L
WHITE ELM IGE QN: 2
WHITE OAK IGE QN: 24.1 KUA/L

## 2021-08-02 ENCOUNTER — TRANSCRIPTION ENCOUNTER (OUTPATIENT)
Age: 66
End: 2021-08-02

## 2021-08-09 ENCOUNTER — APPOINTMENT (OUTPATIENT)
Dept: INTERNAL MEDICINE | Facility: CLINIC | Age: 66
End: 2021-08-09
Payer: COMMERCIAL

## 2021-08-09 VITALS
DIASTOLIC BLOOD PRESSURE: 80 MMHG | WEIGHT: 253 LBS | BODY MASS INDEX: 38.34 KG/M2 | OXYGEN SATURATION: 96 % | HEIGHT: 68 IN | SYSTOLIC BLOOD PRESSURE: 130 MMHG | HEART RATE: 78 BPM

## 2021-08-09 DIAGNOSIS — Z23 ENCOUNTER FOR IMMUNIZATION: ICD-10-CM

## 2021-08-09 PROCEDURE — 90715 TDAP VACCINE 7 YRS/> IM: CPT

## 2021-08-09 PROCEDURE — 90471 IMMUNIZATION ADMIN: CPT

## 2021-08-09 PROCEDURE — G0442 ANNUAL ALCOHOL SCREEN 15 MIN: CPT | Mod: 59

## 2021-08-09 PROCEDURE — 99204 OFFICE O/P NEW MOD 45 MIN: CPT | Mod: 25

## 2021-08-09 PROCEDURE — G0444 DEPRESSION SCREEN ANNUAL: CPT

## 2021-08-09 PROCEDURE — G0447 BEHAVIOR COUNSEL OBESITY 15M: CPT

## 2021-08-09 NOTE — REVIEW OF SYSTEMS
[Fever] : no fever [Chills] : no chills [Chest Pain] : no chest pain [Shortness Of Breath] : no shortness of breath [Abdominal Pain] : no abdominal pain [Nocturia] : nocturia [Joint Pain] : joint pain

## 2021-08-09 NOTE — HISTORY OF PRESENT ILLNESS
[FreeTextEntry1] : Visit to establish care with new primary care doctor\par  [de-identified] : 65F retired  followed by [facility] for WTC exposure and related respiratory symptoms presents for visit to establish care with new PMD and told me she was referred here as her [facility] doctor is concerned about blood sugar which is high. \par \par Tried diets, vegetarian/vegan - hard to stick to. \par Drinks coffee and tea; no alcohol, no juice, no soda\par Ate clean for 3 weeks, drove daughter to [place]. Lot of walking. When she returned she went on a binge.\par Since last visit - off carbs, none at all. \par Equal instead of sugar\par Salads, veggies, protein - chicken, fish. Flounder, shrimps, scallops, crab. Less beef, pork less too. \par Sugar free jello with whipped cream. \par \par Reviewed food diary\par In summary, breakfast usually consists of 2 egg omelette with avocado, hassan, onions, mushrooms, cheese.  Accompanied by 2 cups of coffee with sugar or equal.  Snack is sometimes celery and hummus.  Lunch is usually salad and chicken, sometimes crab cake.  Dinner is usually sausage or hamburger and vegetables like broccoli, [place] sprouts, green beans.  Dessert is usually Jell-O and with cream.\par \par Asthma since age 4, less of an issue in between came back when she was pregnant with her daughter age 25. At that time was kind of seasonal or related to cold/URI. After 9/11 got worse. 4-5x/year attacks. Not hospitalized. Monitoring with [facility] doctor. \par \par Pfizer 2/24/21 and 3/17/21

## 2021-08-09 NOTE — PHYSICAL EXAM
[No Varicosities] : no varicosities [Pedal Pulses Present] : the pedal pulses are present [No Edema] : there was no peripheral edema [No Extremity Clubbing/Cyanosis] : no extremity clubbing/cyanosis [Normal Appearance] : normal in appearance [No Nipple Discharge] : no nipple discharge [No Axillary Lymphadenopathy] : no axillary lymphadenopathy [Soft] : abdomen soft [Non Tender] : non-tender [Non-distended] : non-distended [No Masses] : no abdominal mass palpated [No HSM] : no HSM [Normal Supraclavicular Nodes] : no supraclavicular lymphadenopathy [Coordination Grossly Intact] : coordination grossly intact [No Focal Deficits] : no focal deficits [Normal] : affect was normal and insight and judgment were intact

## 2021-08-09 NOTE — PAST MEDICAL HISTORY
[Postmenopausal] : postmenopausal [Approximately ___] : the LMP was approximately [unfilled] [Total Preg ___] : G[unfilled]

## 2021-08-09 NOTE — COUNSELING
[Structured Weight Management Program suggested:] : Structured weight management program suggested [Encouraged to maintain food diary] : Encouraged to maintain food diary [Encouraged to increase physical activity] : Encouraged to increase physical activity [Needs reinforcement, provided] : Patient needs reinforcement on understanding of disease, goals and obesity follow-up plan; reinforcement was provided [FreeTextEntry1] : weight watchers, my fitness pal, noom [FreeTextEntry2] : Reviewed food diary that patient has diligently kept over the past week or so.  It looks like breakfast is a big source of cholesterol–eggs, cheese, hassan almost every day.  Trying to replace sugar with equal where possible.  Lunch and dinner frequently consist of beef or pork source–sausage like Kielbasa or hamburger or salami.  Minimal bread/carb intake however this sometimes leads patient to binge because a very restrictive diet.  Interested in trying new which seems like it might be okay.\par \par Reviewed which foods are contributing to cholesterol, saturated fat intake.  Advised to try and reduce pork and beef, replace with chicken or fish also that crab and salmon croquette may not be giving her the health benefits from seafood that she is looking for.  Add whole-grain low glycemic index carbohydrates to diet.  Increase exercise. \par \par Reviewed ASCVD risk and elevated cholesterol that was done with [facility] doctor in July.  Opting to wait until next visit in 3 months and repeat the cholesterol then but would advise statin.  [FreeTextEntry4] : 20

## 2021-08-09 NOTE — HEALTH RISK ASSESSMENT
[Good] : ~his/her~  mood as  good [10-14] : 10-14 [Yes] : Yes [Monthly or less (1 pt)] : Monthly or less (1 point) [1 or 2 (0 pts)] : 1 or 2 (0 points) [Never (0 pts)] : Never (0 points) [No] : In the past 12 months have you used drugs other than those required for medical reasons? No [0] : 2) Feeling down, depressed, or hopeless: Not at all (0) [PHQ-2 Negative - No further assessment needed] : PHQ-2 Negative - No further assessment needed [No Retinopathy] : No retinopathy [Patient reported mammogram was normal] : Patient reported mammogram was normal [Patient reported colonoscopy was normal] : Patient reported colonoscopy was normal [None] : None [Alone] : lives alone [Retired] : retired [Single] : single [Feels Safe at Home] : Feels safe at home [Fully functional (bathing, dressing, toileting, transferring, walking, feeding)] : Fully functional (bathing, dressing, toileting, transferring, walking, feeding) [Fully functional (using the telephone, shopping, preparing meals, housekeeping, doing laundry, using] : Fully functional and needs no help or supervision to perform IADLs (using the telephone, shopping, preparing meals, housekeeping, doing laundry, using transportation, managing medications and managing finances) [HIV Test offered] : HIV Test offered [Hepatitis C test offered] : Hepatitis C test offered [] : No [de-identified] : 20 years 1/2 ppd [YearQuit] : 1997 [Audit-CScore] : 1 [de-identified] : walking for exercise [de-identified] : see HPI [[id number]] : 0 [EyeExamDate] : 2/4/2020 [Sexually Active] : not sexually active [Reports changes in hearing] : Reports no changes in hearing [Reports changes in vision] : Reports no changes in vision [Reports changes in dental health] : Reports no changes in dental health [MammogramDate] : 8/3/21 [ColonoscopyDate] : 9/18/19 [FreeTextEntry2] :  and Massage Therapy

## 2021-08-09 NOTE — ASSESSMENT
[FreeTextEntry1] : 65F with BMI 38, borderline blood pressure, asthma excerbated by WTC exposure presents for visit to establish care with new PMD and address elevated blood glucose on last BMP. \par \par Per last note patient noted to be tachycardic and referred to our practice\par \par 1. Tachycardia -normal heart rate in office today.  Heart sounds regular no MRG\par \par 2. Obesity - BMI 38\par -During today's visit we discussed that to control cholesterol and blood sugars as well as maintain healthy blood pressure it is recommended to eat plenty of fruits and vegetables, drink adequate water and exercise regularly. We discussed decreasing saturated fats (found in red meat, dairy products like beef, pork, cheese, butter etc) and decreasing meals high in sugar or simple carbohydrates. Plate at meal time should have 50% of the surface covered in vegetables, 25% a lean meat like chicken or fish and 25% a whole grain or healthy carbohydrate. Advised to avoid sodas, juices, alcohol as well.\par \par 3. HCM\par -ASCVD risk score 8.31% -would recommend statin however patient to try dietary changes and will repeat next visit\par -cervical cancer screening scheduled in August\par -colon cancer screening -up-to-date in 2019.  Was normal and to repeat in 10 years\par -HIV/HCV testing offered today\par -pneumovax - next visit\par -TDAP -given today\par -shingrix- will obtain in pharmacy\par -COVID vaccine - pfizer completed\par -labs as per plan\par \par f/u 3 months

## 2021-08-10 ENCOUNTER — RX RENEWAL (OUTPATIENT)
Age: 66
End: 2021-08-10

## 2021-08-10 LAB
ALBUMIN SERPL ELPH-MCNC: 4.6 G/DL
ALP BLD-CCNC: 50 U/L
ALT SERPL-CCNC: 16 U/L
ANION GAP SERPL CALC-SCNC: 13 MMOL/L
AST SERPL-CCNC: 12 U/L
BILIRUB SERPL-MCNC: 0.6 MG/DL
BUN SERPL-MCNC: 10 MG/DL
CALCIUM SERPL-MCNC: 9.5 MG/DL
CHLORIDE SERPL-SCNC: 101 MMOL/L
CO2 SERPL-SCNC: 25 MMOL/L
CREAT SERPL-MCNC: 0.63 MG/DL
ESTIMATED AVERAGE GLUCOSE: 318 MG/DL
GLUCOSE SERPL-MCNC: 196 MG/DL
HBA1C MFR BLD HPLC: 12.7 %
HCV AB SER QL: NONREACTIVE
HCV S/CO RATIO: 0.08 S/CO
HIV1+2 AB SPEC QL IA.RAPID: NONREACTIVE
POTASSIUM SERPL-SCNC: 4.2 MMOL/L
PROT SERPL-MCNC: 7.2 G/DL
SODIUM SERPL-SCNC: 139 MMOL/L

## 2021-08-28 ENCOUNTER — APPOINTMENT (OUTPATIENT)
Dept: DISASTER EMERGENCY | Facility: CLINIC | Age: 66
End: 2021-08-28

## 2021-08-29 LAB — SARS-COV-2 N GENE NPH QL NAA+PROBE: NOT DETECTED

## 2021-08-30 ENCOUNTER — APPOINTMENT (OUTPATIENT)
Dept: PULMONOLOGY | Facility: CLINIC | Age: 66
End: 2021-08-30

## 2021-09-01 ENCOUNTER — APPOINTMENT (OUTPATIENT)
Dept: PULMONOLOGY | Facility: CLINIC | Age: 66
End: 2021-09-01
Payer: COMMERCIAL

## 2021-09-01 VITALS
DIASTOLIC BLOOD PRESSURE: 85 MMHG | TEMPERATURE: 97 F | WEIGHT: 250 LBS | HEART RATE: 90 BPM | HEIGHT: 68 IN | RESPIRATION RATE: 15 BRPM | BODY MASS INDEX: 37.89 KG/M2 | OXYGEN SATURATION: 94 % | SYSTOLIC BLOOD PRESSURE: 133 MMHG

## 2021-09-01 VITALS
BODY MASS INDEX: 37.89 KG/M2 | HEIGHT: 68 IN | WEIGHT: 250 LBS | OXYGEN SATURATION: 95 % | HEART RATE: 86 BPM | TEMPERATURE: 97.9 F

## 2021-09-01 DIAGNOSIS — R06.83 SNORING: ICD-10-CM

## 2021-09-01 PROCEDURE — 90670 PCV13 VACCINE IM: CPT

## 2021-09-01 PROCEDURE — G0009: CPT

## 2021-09-01 PROCEDURE — 94729 DIFFUSING CAPACITY: CPT

## 2021-09-01 PROCEDURE — 94726 PLETHYSMOGRAPHY LUNG VOLUMES: CPT

## 2021-09-01 PROCEDURE — 94060 EVALUATION OF WHEEZING: CPT

## 2021-09-01 PROCEDURE — ZZZZZ: CPT

## 2021-09-01 PROCEDURE — 99204 OFFICE O/P NEW MOD 45 MIN: CPT | Mod: 25

## 2021-09-01 NOTE — ASSESSMENT
[FreeTextEntry1] : 66 year old female with history of asthma and C exposure comes in to establish care. She likely has allergy type asthma as she is controlled with pulmicort and with claritin. She should continue this regimen for now and has been instructed to follow up with me if she notices that she needs to use her albuterol inhaler more than 4 or 5 times a week. If that is the case either increasing her pulmicort to twice a day or 160 or even adding montelukast would be beneficial. \par \par Finally, we spoke at length regarding the need for ANDREA evaluation given her asthma. She has agreed to think about perusing a PSG as this was all ready offered by [facility] but she is fearful of using a CPAP machine. Discussed other treatment modalities and the relationship between ANDREA and asthma. \par \par She received the Pneumovax today. \par \par Follow up with me in 6-12 months or sooner if needed.

## 2021-09-01 NOTE — HISTORY OF PRESENT ILLNESS
[Former] : former [< 30 pack-years] : < 30 pack-years [Never] : never [Awakes with Dry Mouth] : awakes with dry mouth [Snoring] : snoring [TextBox_4] : Ms. Disla is a 66 year old female with a significant pmhx of asthma and WTC exposure here to establish care. She states that when she went to see Dr. Patel in July, her asthma was worse and she required albuterol about 4-5 times a week. She takes Pulmicort and claritin which helps with her asthma. She states she has an extensive asthma history and has several environmental allergies. She currently does not use her albuterol more than 1-2 times a week which she consider this her normal. She admits to only using her pulmicort once a day. She has not been evaluated for ANDREA but has been told she has a high probability for it. \par \par ______________________________________________________________________________________ \par EPWORTH SLEEPINESS SCALE \par How likely are you to doze off or fall asleep in the situations described below, in contrast to feeling just tired? \par This refers to your usual way of life in recent times. \par Even if you haven't done some of these things recently, try to work out how they would have affected you. \par Use the following scale to choose one most appropriate number for each situation. \par \par 0 = Never would doze \par 1 = Slight chance of dozing \par 2 = Moderate chance of dozing \par 3 = High chance of dozing \par \par  \par 0........................................Sitting and reading \par 0........................................Watching TV \par 0........................................Sitting inactive in a public place (eg a theatre or a meeting) \par 0........................................As a passenger in a car for an hour without a break \par 0........................................Lying down to rest in the afternoon when circumstances permit \par 0........................................Sitting and talking to someone \par 0........................................Sitting quietly after lunch without alcohol \par 0........................................In a car, while stopped for a few minutes in traffic \par 0........................................TOTAL SCORE \par ______________________________________________________________________________________ \par \par  [Awakes Unrefreshed] : does not awaken unrefreshed [Awakes with Headache] : does not awaken with headache [Difficulty Initiating Sleep] : does not have difficulty initiating sleep [Difficulty Maintaining Sleep] : does not have difficulty maintaining sleep [Frequent Nocturnal Awakening] : denies frequent nocturnal awakening [Hypersomnolence] : denies hypersomnolence [Nonrestorative Sleep] : denies nonrestorative sleep [Tired while Driving] : not tired while driving [Witnessed Apneas] : no witnessed apneas

## 2021-09-01 NOTE — PHYSICAL EXAM
[Normal Oropharynx] : normal oropharynx [No Acute Distress] : no acute distress [IV] : Mallampati Class: IV [Normal Appearance] : normal appearance [No Neck Mass] : no neck mass [Normal Rate/Rhythm] : normal rate/rhythm [Normal S1, S2] : normal s1, s2 [No Murmurs] : no murmurs [No Resp Distress] : no resp distress [Clear to Auscultation Bilaterally] : clear to auscultation bilaterally [No Clubbing] : no clubbing [No Cyanosis] : no cyanosis [No Edema] : no edema [No Focal Deficits] : no focal deficits [Oriented x3] : oriented x3 [Normal Affect] : normal affect

## 2021-09-07 ENCOUNTER — FORM ENCOUNTER (OUTPATIENT)
Age: 66
End: 2021-09-07

## 2021-09-16 ENCOUNTER — TRANSCRIPTION ENCOUNTER (OUTPATIENT)
Age: 66
End: 2021-09-16

## 2021-09-17 ENCOUNTER — TRANSCRIPTION ENCOUNTER (OUTPATIENT)
Age: 66
End: 2021-09-17

## 2021-10-08 ENCOUNTER — TRANSCRIPTION ENCOUNTER (OUTPATIENT)
Age: 66
End: 2021-10-08

## 2021-10-11 ENCOUNTER — RX RENEWAL (OUTPATIENT)
Age: 66
End: 2021-10-11

## 2021-10-11 ENCOUNTER — TRANSCRIPTION ENCOUNTER (OUTPATIENT)
Age: 66
End: 2021-10-11

## 2021-10-12 ENCOUNTER — TRANSCRIPTION ENCOUNTER (OUTPATIENT)
Age: 66
End: 2021-10-12

## 2021-10-19 ENCOUNTER — TRANSCRIPTION ENCOUNTER (OUTPATIENT)
Age: 66
End: 2021-10-19

## 2021-10-23 ENCOUNTER — TRANSCRIPTION ENCOUNTER (OUTPATIENT)
Age: 66
End: 2021-10-23

## 2021-10-25 ENCOUNTER — TRANSCRIPTION ENCOUNTER (OUTPATIENT)
Age: 66
End: 2021-10-25

## 2021-11-10 ENCOUNTER — NON-APPOINTMENT (OUTPATIENT)
Age: 66
End: 2021-11-10

## 2021-11-11 ENCOUNTER — APPOINTMENT (OUTPATIENT)
Dept: INTERNAL MEDICINE | Facility: CLINIC | Age: 66
End: 2021-11-11
Payer: COMMERCIAL

## 2021-11-11 VITALS — SYSTOLIC BLOOD PRESSURE: 125 MMHG | DIASTOLIC BLOOD PRESSURE: 75 MMHG

## 2021-11-11 VITALS — HEIGHT: 68 IN | BODY MASS INDEX: 37.28 KG/M2 | WEIGHT: 246 LBS

## 2021-11-11 LAB
GLUCOSE BLDC GLUCOMTR-MCNC: 129
HBA1C MFR BLD HPLC: 7.1

## 2021-11-11 PROCEDURE — 83036 HEMOGLOBIN GLYCOSYLATED A1C: CPT | Mod: QW

## 2021-11-11 PROCEDURE — G0008: CPT

## 2021-11-11 PROCEDURE — 99214 OFFICE O/P EST MOD 30 MIN: CPT | Mod: 25

## 2021-11-11 PROCEDURE — 90686 IIV4 VACC NO PRSV 0.5 ML IM: CPT

## 2021-11-11 PROCEDURE — 82962 GLUCOSE BLOOD TEST: CPT

## 2021-11-11 NOTE — PHYSICAL EXAM
[No Respiratory Distress] : no respiratory distress  [Coordination Grossly Intact] : coordination grossly intact [Normal] : affect was normal and insight and judgment were intact

## 2021-11-11 NOTE — COUNSELING
[Good understanding] : Patient has a good understanding of disease, goals and obesity follow-up plan [FreeTextEntry2] : pt losing weight with dietary intervention

## 2021-11-11 NOTE — ASSESSMENT
[FreeTextEntry1] : 66F with BMI 38, HTN, asthma and uncontrolled T2DM presents for follow up of HTN and T2DM. Last a1c 12.7% with today's a1c 7.1% showing excellent control of T2DM\par \par 1. T2DM, controlled\par -last a1c in August 12.7%, repeat today was 7.1%\par -urine albumin cr ratio - normal on labs done with Dr. Dunne\par -eye exam - 12/8/21 planned.\par -foot exam - normal today 11/11/21\par -pneumovax utd \par -statin (ldl last lipid profile Jul/2021 was 152)\par -medications: metformin 1000 mg BID and Rybelsus 3 mg. No longer seeing Dr. Dunne. \par \par 2. Obesity - BMI 38\par -Reviewed food diary last visit; pt working on proposed changes. \par \par 3. Asthma, ?ANDREA\par -reviewed note from Dr. Yee 9/1/21 - recommended for sleep study which pt planning to do through WTC\par \par 4. HCM\par -ASCVD risk score 8.31% -would recommend statin however patient to try dietary changes and will repeat next visit\par -cervical cancer screening scheduled in August\par -colon cancer screening -up-to-date in 2019. Was normal and to repeat in 10 years\par -HIV/HCV screening negative. \par -pneumovax - up to date\par -TDAP - up to date\par -shingrix- will obtain in pharmacy\par -COVID vaccine - pfizer completed and booster completed 10/18/21\par -flu vaccine - giving dose today\par \par rtc in

## 2021-11-11 NOTE — HISTORY OF PRESENT ILLNESS
[FreeTextEntry1] : Follow up of t2dm and obesity [de-identified] : 66F with BMI 38, HTN, asthma and uncontrolled T2DM presents for follow up of HTN and T2DM. Last a1c 12.7%\par Most recent A1c 7.9% that was in October. Today's a1c is 7.1%\par Feels her body is rejecting \par Checking FS at home getting \par FS now 129\par Breakfast was yogurt, blueberries, strawberries and granola.\par \par Saw podiatrist - Dr Perez 10/28/21

## 2021-11-16 ENCOUNTER — TRANSCRIPTION ENCOUNTER (OUTPATIENT)
Age: 66
End: 2021-11-16

## 2021-12-02 ENCOUNTER — APPOINTMENT (OUTPATIENT)
Dept: OPHTHALMOLOGY | Facility: CLINIC | Age: 66
End: 2021-12-02

## 2021-12-05 ENCOUNTER — NON-APPOINTMENT (OUTPATIENT)
Age: 66
End: 2021-12-05

## 2021-12-08 ENCOUNTER — NON-APPOINTMENT (OUTPATIENT)
Age: 66
End: 2021-12-08

## 2021-12-08 ENCOUNTER — APPOINTMENT (OUTPATIENT)
Dept: OPHTHALMOLOGY | Facility: CLINIC | Age: 66
End: 2021-12-08
Payer: COMMERCIAL

## 2021-12-08 PROCEDURE — 92133 CPTRZD OPH DX IMG PST SGM ON: CPT

## 2021-12-08 PROCEDURE — 92004 COMPRE OPH EXAM NEW PT 1/>: CPT

## 2022-01-31 ENCOUNTER — RX RENEWAL (OUTPATIENT)
Age: 67
End: 2022-01-31

## 2022-02-09 ENCOUNTER — NON-APPOINTMENT (OUTPATIENT)
Age: 67
End: 2022-02-09

## 2022-02-09 ENCOUNTER — APPOINTMENT (OUTPATIENT)
Dept: OPHTHALMOLOGY | Facility: CLINIC | Age: 67
End: 2022-02-09
Payer: COMMERCIAL

## 2022-02-09 PROCEDURE — 92083 EXTENDED VISUAL FIELD XM: CPT

## 2022-02-09 PROCEDURE — 76514 ECHO EXAM OF EYE THICKNESS: CPT

## 2022-02-09 PROCEDURE — 92012 INTRM OPH EXAM EST PATIENT: CPT

## 2022-02-14 ENCOUNTER — APPOINTMENT (OUTPATIENT)
Dept: INTERNAL MEDICINE | Facility: CLINIC | Age: 67
End: 2022-02-14
Payer: COMMERCIAL

## 2022-02-14 VITALS
RESPIRATION RATE: 14 BRPM | SYSTOLIC BLOOD PRESSURE: 160 MMHG | HEIGHT: 68 IN | WEIGHT: 244 LBS | DIASTOLIC BLOOD PRESSURE: 78 MMHG | BODY MASS INDEX: 36.98 KG/M2 | HEART RATE: 84 BPM | OXYGEN SATURATION: 95 %

## 2022-02-14 VITALS — BODY MASS INDEX: 37.1 KG/M2 | DIASTOLIC BLOOD PRESSURE: 70 MMHG | SYSTOLIC BLOOD PRESSURE: 120 MMHG | WEIGHT: 244 LBS

## 2022-02-14 DIAGNOSIS — J45.909 UNSPECIFIED ASTHMA, UNCOMPLICATED: ICD-10-CM

## 2022-02-14 LAB — HBA1C MFR BLD HPLC: 6.2

## 2022-02-14 PROCEDURE — 99214 OFFICE O/P EST MOD 30 MIN: CPT

## 2022-02-14 NOTE — HISTORY OF PRESENT ILLNESS
[FreeTextEntry1] : f/u of t2dm [de-identified] : 66F with BMI 38, HTN, asthma and uncontrolled T2DM presents for follow up of HTN and T2DM. Last a1c7.1%\par \par Saw podiatrist - Dr Perez 10/28/21. Saw him two more times and told circulation and sensation are normal\par \par FS lo/13 92,  115,  128,  100. Consistently . \par \par Scale got down to 230s lbs but went up\par \par Also had COVID infection which resolved.

## 2022-02-14 NOTE — ASSESSMENT
[FreeTextEntry1] : 66F with BMI 38, HTN, asthma and controlled T2DM presents for follow up of HTN and T2DM\par \par 1. T2DM, controlled\par -last a1c in August 12.7%, repeat 11/2021 was 7.1% and today is 6.2%.\par -urine albumin cr ratio - normal on labs done with Dr. Dunne\par -eye exam -02/09/22, no retinopathy but high risk glaucoma. she is going to see specialist. \par -foot exam - normal today 11/11/21\par -pneumovax utd \par -statin (ldl last lipid profile Jul/2021 was 152) - repeat next visit.\par -medications: metformin 1000 mg BID and Rybelsus 3 mg. \par \par 2. Obesity - BMI 38\par -pt working on diet/exercise\par \par 3. Asthma, ?ANDREA\par -reviewed note from Dr. Yee 9/1/21 - recommended for sleep study which pt planning to do through [facility]TC\par -symptoms controlled, sleep study pending\par \par 4. HCM\par -ASCVD risk score 8.31% - consider statin\par -cervical cancer screening scheduled in August\par -colon cancer screening -up-to-date in 2019. Was normal and to repeat in 10 years\par -HIV/HCV screening negative. \par -pneumovax - up to date\par -TDAP - up to date\par -shingrix- will obtain in pharmacy\par -COVID vaccine - pfizer completed and booster completed 10/18/21\par -flu vaccine - UTD\par \par rtc in

## 2022-02-14 NOTE — PHYSICAL EXAM
[No Respiratory Distress] : no respiratory distress  [Coordination Grossly Intact] : coordination grossly intact [Normal Gait] : normal gait [Normal] : affect was normal and insight and judgment were intact

## 2022-03-18 ENCOUNTER — RX RENEWAL (OUTPATIENT)
Age: 67
End: 2022-03-18

## 2022-03-31 ENCOUNTER — NON-APPOINTMENT (OUTPATIENT)
Age: 67
End: 2022-03-31

## 2022-03-31 ENCOUNTER — APPOINTMENT (OUTPATIENT)
Dept: OPHTHALMOLOGY | Facility: CLINIC | Age: 67
End: 2022-03-31
Payer: COMMERCIAL

## 2022-03-31 PROCEDURE — 92014 COMPRE OPH EXAM EST PT 1/>: CPT

## 2022-03-31 PROCEDURE — 92133 CPTRZD OPH DX IMG PST SGM ON: CPT

## 2022-04-26 ENCOUNTER — TRANSCRIPTION ENCOUNTER (OUTPATIENT)
Age: 67
End: 2022-04-26

## 2022-05-16 ENCOUNTER — APPOINTMENT (OUTPATIENT)
Dept: INTERNAL MEDICINE | Facility: CLINIC | Age: 67
End: 2022-05-16
Payer: COMMERCIAL

## 2022-05-16 VITALS
DIASTOLIC BLOOD PRESSURE: 78 MMHG | HEIGHT: 68 IN | WEIGHT: 243 LBS | HEART RATE: 110 BPM | BODY MASS INDEX: 36.83 KG/M2 | OXYGEN SATURATION: 97 % | SYSTOLIC BLOOD PRESSURE: 138 MMHG

## 2022-05-16 LAB — HBA1C MFR BLD HPLC: 6.5

## 2022-05-16 PROCEDURE — 99214 OFFICE O/P EST MOD 30 MIN: CPT | Mod: 25

## 2022-05-16 PROCEDURE — 83036 HEMOGLOBIN GLYCOSYLATED A1C: CPT | Mod: QW

## 2022-05-16 NOTE — PHYSICAL EXAM
[No Acute Distress] : no acute distress [Normal Voice/Communication] : normal voice/communication [No Respiratory Distress] : no respiratory distress  [Coordination Grossly Intact] : coordination grossly intact [Normal] : affect was normal and insight and judgment were intact

## 2022-05-16 NOTE — HISTORY OF PRESENT ILLNESS
[FreeTextEntry1] : f/u of t2dm [de-identified] : 66F with BMI 37 (decreasing), HTN, asthma and controlled T2DM presents for follow up of HTN and T2DM. Last a1c 6.2%\par \par FS log: sometimes goes down to 89 in AM. No lightheadedness, tremor etc. She does feel unwell with high blood sugars. \par Metamucil controls blood sugars. Keeps it under 100. She usually takes it with lunch. \par \par Weight: upset stomach if she strays outside healthy diet. having trouble losing more weight - goes below 240 then increases again. \par

## 2022-05-16 NOTE — ASSESSMENT
[FreeTextEntry1] : 66F with obesity and intentional weight loss, HTN, asthma and controlled T2DM presents for follow up of HTN and T2DM\par \par 1. T2DM, controlled\par -last a1c in August 12.7%, repeat 11/2021 was 7.1% and 02/2022 is 6.2%., today: 6.5%\par -urine albumin cr ratio - normal on labs done with Dr. Dunne. (not following with him anymore)\par -eye exam -02/09/22, no retinopathy but high risk glaucoma. she is going to see specialist. \par -foot exam - normal 11/11/21\par -pneumovax utd \par -statin (ldl last lipid profile Jul/2021 was 152) - \par -medications: metformin 1000 mg BID and Rybelsus 3 mg. \par \par 2. Obesity - BMI 38\par -pt working on diet/exercise - try maintaining weight for some time then calorie deficit/weight loss again since pt experiencing improved energy and blood sugar with increasing  healthy food intake on some days. \par -discussed increasing rybelsus to 7 mg but pt had negative side effects then so we will stay at 3 mg - she is losing weight on her own without dose increase\par \par 3. Asthma, ?ANDREA\par -reviewed note from Dr. Yee 9/1/21 - recommended for sleep study which pt planning to do through [facility]\par -symptoms controlled, sleep study pending\par \par 4. HCM\par -ASCVD risk score 8.31% - consider statin\par -cervical cancer screening scheduled in August\par -colon cancer screening -up-to-date in 2019. Was normal and to repeat in 10 years\par -HIV/HCV screening negative. \par -pneumovax - up to date\par -TDAP - up to date\par -shingrix- will obtain in pharmacy\par -COVID vaccine - pfizer completed and booster completed 10/18/21. Going to do second booster. \par -flu vaccine - UTD\par \par rtc in

## 2022-07-19 ENCOUNTER — APPOINTMENT (OUTPATIENT)
Dept: OTHER | Facility: CLINIC | Age: 67
End: 2022-07-19

## 2022-07-19 VITALS
RESPIRATION RATE: 18 BRPM | HEIGHT: 68 IN | TEMPERATURE: 98.3 F | DIASTOLIC BLOOD PRESSURE: 85 MMHG | BODY MASS INDEX: 36.83 KG/M2 | SYSTOLIC BLOOD PRESSURE: 130 MMHG | OXYGEN SATURATION: 95 % | HEART RATE: 98 BPM | WEIGHT: 243 LBS

## 2022-07-19 DIAGNOSIS — Z12.9 ENCOUNTER FOR SCREENING FOR MALIGNANT NEOPLASM, SITE UNSPECIFIED: ICD-10-CM

## 2022-07-19 PROCEDURE — 99213 OFFICE O/P EST LOW 20 MIN: CPT | Mod: 25

## 2022-07-19 PROCEDURE — 99396 PREV VISIT EST AGE 40-64: CPT | Mod: 25

## 2022-07-19 NOTE — ASSESSMENT
[FreeTextEntry1] : asthma-  well controlled \par \par \par continue maintenance inhaler Pulmicort 90 mcg 2 puffs daily \par \par  She sill use Nasonex and allegra  for nasal congestion - but her Sx are mild \par \par \par

## 2022-07-19 NOTE — DISCUSSION/SUMMARY
[Patient seen for WTC Monitoring ___] : Patient was seen for WTC monitoring [unfilled] [Please See Note in Chart and Documentation in Trial DB] : Please see note in chart and documentation in Trial DB. [FreeTextEntry3] : 65 yo female, retired from [employer] \par \par \par \par She quit smoking over 20  years ago, smoked 15-17 years, about 0.5 PPD. She currently works as a massage therapist per anh.\par \par \par \par \par Ms. 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 She was stationed [facility]. \par \par ROS in trial DB \par PE: in trial DB \par Spirometry: deferred \par A/P: Annual WTC MV10\par \par  labs  ordered \par  see Occ MEd follow up note \par  mammogram ordered but pt will do though her insurance \par explained the difference in screening recommendations by different entities \par \par \par American Cancer Society Guidelines: \par \par Women age 45 to 54 should get mammograms every year.\par Women 55 and older should switch to mammograms every 2 years, or can continue yearly screening.\par \par USPFT recommendation / CDC follows this  recommendations/  : \par The USPSTF recommends biennial screening mammography for women 50-74 years. pt opted for annual breast cancer screening

## 2022-07-19 NOTE — PAST MEDICAL HISTORY
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 She was stationed [facility].

## 2022-07-19 NOTE — PAST MEDICAL HISTORY
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 She was stationed [facility].

## 2022-07-19 NOTE — HISTORY OF PRESENT ILLNESS
[FreeTextEntry1] : using maintenance inhaler once a day 2 puffs - Pulmicort \par only used rescue inhaler 6 times in the past year \par  Xopenex HFA does not cause her to be jittery \par \par  no wheezing \par  SOB only when exposed to very hot and humid conditions \par  no CP \par no allergy Sx\par no snoring \par  no heartburn \par NO ER visits in the past 10 years\par She was seeing Dr Rodriguez Pulmonologist in the past \par evaluated by Dr Frazier 06 18 2019\par  and Dr Yee in 2021 \par had PFT 09 01 2022- [facility]\par \par she stated that she is no longer snoring since she lost weight \par  she feels no daytime fatigue \par  she was recommended to have sleep study by Dr Yee but she is reluctant \par \par \par  she was Dx with [facility] last year but her HBA1C is 6.2 with treatment \par \par \par \par \par \par \par \par \par \par Spirometry : deferred \par \par \par

## 2022-07-19 NOTE — DISCUSSION/SUMMARY
[Patient seen for WTC Monitoring ___] : Patient was seen for WTC monitoring [unfilled] [Please See Note in Chart and Documentation in Trial DB] : Please see note in chart and documentation in Trial DB. [FreeTextEntry3] : 65 yo female, retired from [employer] \par \par \par \par She quit smoking over 20  years ago, smoked 15-17 years, about 0.5 PPD. She currently works as a massage therapist per anh.\par \par \par \par \par Ms. 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 She was stationed [facility]. \par \par ROS in trial DB \par PE: in trial DB \par Spirometry: deferred \par A/P: Annual WTC MV10\par \par  labs  ordered \par  see Occ MEd follow up note \par  mammogram ordered but pt will do though her insurance \par explained the difference in screening recommendations by different entities \par \par \par American Cancer Society Guidelines: \par \par Women age 45 to 54 should get mammograms every year.\par Women 55 and older should switch to mammograms every 2 years, or can continue yearly screening.\par \par USPFT recommendation / CDC follows this  recommendations/  : \par The USPSTF recommends biennial screening mammography for women 50-74 years. pt opted for annual breast cancer screening

## 2022-07-19 NOTE — HEALTH RISK ASSESSMENT
[Patient reported mammogram was normal] : Patient reported mammogram was normal [Patient reported PAP Smear was normal] : Patient reported PAP Smear was normal [Patient reported colonoscopy was normal] : Patient reported colonoscopy was normal [MammogramDate] : 2021 [PapSmearDate] : 2021 [ColonoscopyDate] : 2019

## 2022-07-19 NOTE — PHYSICAL EXAM
[General Appearance - Alert] : alert [General Appearance - In No Acute Distress] : in no acute distress [Neck Appearance] : the appearance of the neck was normal [Neck Cervical Mass (___cm)] : no neck mass was observed [Jugular Venous Distention Increased] : there was no jugular-venous distention [Thyroid Nodule] : there were no palpable thyroid nodules [Thyroid Diffuse Enlargement] : the thyroid was not enlarged [Auscultation Breath Sounds / Voice Sounds] : lungs were clear to auscultation bilaterally [Heart Rate And Rhythm] : heart rate was normal and rhythm regular [Heart Sounds] : normal S1 and S2 [Heart Sounds Gallop] : no gallops [Murmurs] : no murmurs [Heart Sounds Pericardial Friction Rub] : no pericardial rub [Bowel Sounds] : normal bowel sounds [Abdomen Soft] : soft [Abdomen Tenderness] : non-tender [] : no hepato-splenomegaly [Abdomen Mass (___ Cm)] : no abdominal mass palpated

## 2022-07-21 LAB
ALBUMIN SERPL ELPH-MCNC: 4.5 G/DL
ALP BLD-CCNC: 48 U/L
ALT SERPL-CCNC: 9 U/L
ANION GAP SERPL CALC-SCNC: 13 MMOL/L
APPEARANCE: CLEAR
AST SERPL-CCNC: 11 U/L
BACTERIA: NEGATIVE
BASOPHILS # BLD AUTO: 0.06 K/UL
BASOPHILS NFR BLD AUTO: 0.9 %
BILIRUB SERPL-MCNC: 0.4 MG/DL
BILIRUBIN URINE: NEGATIVE
BLOOD URINE: NEGATIVE
BUN SERPL-MCNC: 12 MG/DL
CALCIUM SERPL-MCNC: 9.5 MG/DL
CHLORIDE SERPL-SCNC: 102 MMOL/L
CHOLEST SERPL-MCNC: 222 MG/DL
CO2 SERPL-SCNC: 25 MMOL/L
COLOR: NORMAL
CREAT SERPL-MCNC: 0.78 MG/DL
EGFR: 85 ML/MIN/1.73M2
EOSINOPHIL # BLD AUTO: 0.05 K/UL
EOSINOPHIL NFR BLD AUTO: 0.8 %
GLUCOSE QUALITATIVE U: NEGATIVE
GLUCOSE SERPL-MCNC: 88 MG/DL
HCT VFR BLD CALC: 41.8 %
HDLC SERPL-MCNC: 67 MG/DL
HGB BLD-MCNC: 12.2 G/DL
HYALINE CASTS: 4 /LPF
IMM GRANULOCYTES NFR BLD AUTO: 0.5 %
KETONES URINE: NEGATIVE
LDLC SERPL CALC-MCNC: 128 MG/DL
LEUKOCYTE ESTERASE URINE: NEGATIVE
LYMPHOCYTES # BLD AUTO: 2.46 K/UL
LYMPHOCYTES NFR BLD AUTO: 37 %
MAN DIFF?: NORMAL
MCHC RBC-ENTMCNC: 25.1 PG
MCHC RBC-ENTMCNC: 29.2 GM/DL
MCV RBC AUTO: 85.8 FL
MICROSCOPIC-UA: NORMAL
MONOCYTES # BLD AUTO: 0.4 K/UL
MONOCYTES NFR BLD AUTO: 6 %
NEUTROPHILS # BLD AUTO: 3.64 K/UL
NEUTROPHILS NFR BLD AUTO: 54.8 %
NITRITE URINE: NEGATIVE
NONHDLC SERPL-MCNC: 155 MG/DL
PH URINE: 5.5
PLATELET # BLD AUTO: 264 K/UL
POTASSIUM SERPL-SCNC: 4.2 MMOL/L
PROT SERPL-MCNC: 7.2 G/DL
PROTEIN URINE: NEGATIVE
RBC # BLD: 4.87 M/UL
RBC # FLD: 16 %
RED BLOOD CELLS URINE: 1 /HPF
SODIUM SERPL-SCNC: 140 MMOL/L
SPECIFIC GRAVITY URINE: 1.01
SQUAMOUS EPITHELIAL CELLS: 2 /HPF
TRIGL SERPL-MCNC: 136 MG/DL
UROBILINOGEN URINE: NORMAL
WBC # FLD AUTO: 6.64 K/UL
WHITE BLOOD CELLS URINE: 2 /HPF

## 2022-08-02 NOTE — DISCUSSION/SUMMARY
[Patient seen for WTC Monitoring ___] : Patient was seen for WTC monitoring [unfilled] [Please See Note in Chart and Documentation in Trial DB] : Please see note in chart and documentation in Trial DB. [FreeTextEntry1] : Patient seen face to face by SW for 15 minutes. Patient is here for her annual monitoring. Patient is certified with asthma. [facility] reviewed and data entered in TRIAL DB. Patient denied symptoms of PTSD, anxiety and depression related to her 9/11 efforts. Patient denied having a mental health history. No evidence of risk. SW did provide benefits counseling. Patient knows to call here with questions and concerns. Contact card provided.  [FreeTextEntry3] : 66 yo female, retired from [employer] \par \par \par \par She quit smoking over 20  years ago, smoked 15-17 years, about 0.5 PPD). She currently works as a massage therapist.\par \par \par \par \par Ms. Disla states that she began on the [facility] effort on 9/13/2001-05 2002 12 hours a day she was assigned to the [employer]. 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 She was stationed ADJ[employer] TO THE [facility]"" AND [facility]. \par \par ROS in trial DB \par PE: in trial DB \par Spirometry: deferred \par A/P: Annual WTC MV9\par CXR and \par  labs  ordered \par  see Occ MEd follow up note \par  mammogram ordered \par  rec to see PCP for ECG_ she is tachycardic \par  i will check TSH  Hemostasis: Electrocautery

## 2022-08-15 ENCOUNTER — RESULT REVIEW (OUTPATIENT)
Age: 67
End: 2022-08-15

## 2022-08-15 ENCOUNTER — APPOINTMENT (OUTPATIENT)
Dept: INTERNAL MEDICINE | Facility: CLINIC | Age: 67
End: 2022-08-15

## 2022-08-15 VITALS
HEART RATE: 97 BPM | HEIGHT: 68 IN | BODY MASS INDEX: 37.28 KG/M2 | WEIGHT: 246 LBS | DIASTOLIC BLOOD PRESSURE: 70 MMHG | OXYGEN SATURATION: 96 % | SYSTOLIC BLOOD PRESSURE: 120 MMHG

## 2022-08-15 DIAGNOSIS — M17.10 UNILATERAL PRIMARY OSTEOARTHRITIS, UNSPECIFIED KNEE: ICD-10-CM

## 2022-08-15 DIAGNOSIS — N63.0 UNSPECIFIED LUMP IN UNSPECIFIED BREAST: ICD-10-CM

## 2022-08-15 LAB — HBA1C MFR BLD HPLC: 5.9

## 2022-08-15 PROCEDURE — 83036 HEMOGLOBIN GLYCOSYLATED A1C: CPT | Mod: QW

## 2022-08-15 PROCEDURE — G0402 INITIAL PREVENTIVE EXAM: CPT

## 2022-08-15 PROCEDURE — G0439: CPT

## 2022-08-15 PROCEDURE — G0444 DEPRESSION SCREEN ANNUAL: CPT

## 2022-08-15 NOTE — HISTORY OF PRESENT ILLNESS
[FreeTextEntry1] : Patient presents today for annual physical exam\par  [de-identified] : 66F with obesity and intentional weight loss, HTN, asthma and controlled T2DM presents for CPE. \par \par Right knee pain - started 3 days ago. She was doing something and heard a crack. Her ortho told her previously she has arthritis. \par FS are . Was feeling dizzy with 123. Metamucil keeps blood sugar lower.

## 2022-08-15 NOTE — HEALTH RISK ASSESSMENT
[Patient reported mammogram was normal] : Patient reported mammogram was normal [Patient reported colonoscopy was normal] : Patient reported colonoscopy was normal [HIV Test offered] : HIV Test offered [Hepatitis C test offered] : Hepatitis C test offered [None] : None [Alone] : lives alone [Retired] : retired [Single] : single [Feels Safe at Home] : Feels safe at home [Fully functional (bathing, dressing, toileting, transferring, walking, feeding)] : Fully functional (bathing, dressing, toileting, transferring, walking, feeding) [Fully functional (using the telephone, shopping, preparing meals, housekeeping, doing laundry, using] : Fully functional and needs no help or supervision to perform IADLs (using the telephone, shopping, preparing meals, housekeeping, doing laundry, using transportation, managing medications and managing finances) [Good] : ~his/her~  mood as  good [Never] : Never [Yes] : Yes [Monthly or less (1 pt)] : Monthly or less (1 point) [1 or 2 (0 pts)] : 1 or 2 (0 points) [Never (0 pts)] : Never (0 points) [No] : In the past 12 months have you used drugs other than those required for medical reasons? No [0] : 2) Feeling down, depressed, or hopeless: Not at all (0) [PHQ-2 Negative - No further assessment needed] : PHQ-2 Negative - No further assessment needed [Audit-CScore] : 1 [de-identified] : trying to walk more.  [de-identified] : eating healthy meals [[id number]] : 0 [Patient reported PAP Smear was normal] : Patient reported PAP Smear was normal [Sexually Active] : not sexually active [Reports changes in hearing] : Reports no changes in hearing [Reports changes in vision] : Reports no changes in vision [Reports changes in dental health] : Reports no changes in dental health [MammogramDate] : 8/3/21 [PapSmearDate] : 08/22 [ColonoscopyDate] : 9/18/19 [FreeTextEntry2] :  and Massage Therapy

## 2022-08-15 NOTE — REVIEW OF SYSTEMS
[Joint Pain] : joint pain [Negative] : Heme/Lymph [FreeTextEntry7] : diarrhea with certain foods (usually ones that aren't healthy) resolves with stopping consumption

## 2022-08-15 NOTE — ASSESSMENT
[FreeTextEntry1] : 66F with obesity and intentional weight loss, HTN, asthma and controlled T2DM presents for CPE. \par \par 1. T2DM, controlled\par -last a1c: 6.5%, today 8/15/22 - 5.9%\par -urine albumin cr ratio - repeat today\par -eye exam -02/09/22\par -foot exam - normal 11/11/21\par -pneumovax utd \par -statin: rosuvastatin 5 mg daily \par -medications: metformin 1000 mg BID -- reducing to 500 mg BID given improvement in a1c and continue Rybelsus 3 mg. \par \par 2. Obesity - BMI 38\par -pt working on diet/exercise - not tolerating higher dose of rybelsus (side effects)\par \par 3. Asthma, ?ANDREA\par -sleep study pending (on hold given resolution of snoring with weight loss), reviewed notes from Dr. Patel 7/19/22. \par \par 4. HCM\par -ASCVD risk score 8.31% - consider statin\par -cervical cancer screening scheduled in August\par -mammogram - script given today\par -colon cancer screening -up-to-date in 2019. Was normal and to repeat in 10 years\par -HIV/HCV screening negative. \par -pneumovax - up to date\par -TDAP - up to date\par -shingrix- will obtain in pharmacy\par -COVID vaccine - pfizer completed and booster completed 10/18/21. Second booster completed. \par -flu vaccine - \par \par rtc in . \par

## 2022-08-15 NOTE — PHYSICAL EXAM
[Normal TMs] : both tympanic membranes were normal [No Varicosities] : no varicosities [Pedal Pulses Present] : the pedal pulses are present [No Edema] : there was no peripheral edema [No Palpable Aorta] : no palpable aorta [No Extremity Clubbing/Cyanosis] : no extremity clubbing/cyanosis [Normal Appearance] : normal in appearance [No Nipple Discharge] : no nipple discharge [No Axillary Lymphadenopathy] : no axillary lymphadenopathy [Soft] : abdomen soft [Non Tender] : non-tender [Non-distended] : non-distended [No Masses] : no abdominal mass palpated [Normal Supraclavicular Nodes] : no supraclavicular lymphadenopathy [Coordination Grossly Intact] : coordination grossly intact [No Focal Deficits] : no focal deficits [Normal] : affect was normal and insight and judgment were intact [de-identified] : left breast 3 o clock 2 cm from nipple 1 cm nodule, non tende, no skin changes

## 2022-08-16 ENCOUNTER — TRANSCRIPTION ENCOUNTER (OUTPATIENT)
Age: 67
End: 2022-08-16

## 2022-08-24 ENCOUNTER — RESULT REVIEW (OUTPATIENT)
Age: 67
End: 2022-08-24

## 2022-08-24 ENCOUNTER — APPOINTMENT (OUTPATIENT)
Dept: MAMMOGRAPHY | Facility: CLINIC | Age: 67
End: 2022-08-24

## 2022-08-24 ENCOUNTER — TRANSCRIPTION ENCOUNTER (OUTPATIENT)
Age: 67
End: 2022-08-24

## 2022-08-24 ENCOUNTER — APPOINTMENT (OUTPATIENT)
Dept: ULTRASOUND IMAGING | Facility: CLINIC | Age: 67
End: 2022-08-24

## 2022-08-24 ENCOUNTER — OUTPATIENT (OUTPATIENT)
Dept: OUTPATIENT SERVICES | Facility: HOSPITAL | Age: 67
LOS: 1 days | End: 2022-08-24
Payer: MEDICARE

## 2022-08-24 DIAGNOSIS — Z00.00 ENCOUNTER FOR GENERAL ADULT MEDICAL EXAMINATION WITHOUT ABNORMAL FINDINGS: ICD-10-CM

## 2022-08-24 PROCEDURE — G0279: CPT | Mod: 26

## 2022-08-24 PROCEDURE — 76641 ULTRASOUND BREAST COMPLETE: CPT | Mod: 26,50

## 2022-08-24 PROCEDURE — 76641 ULTRASOUND BREAST COMPLETE: CPT

## 2022-08-24 PROCEDURE — 77066 DX MAMMO INCL CAD BI: CPT

## 2022-08-24 PROCEDURE — 77066 DX MAMMO INCL CAD BI: CPT | Mod: 26

## 2022-08-24 PROCEDURE — G0279: CPT

## 2022-08-25 ENCOUNTER — TRANSCRIPTION ENCOUNTER (OUTPATIENT)
Age: 67
End: 2022-08-25

## 2022-09-13 ENCOUNTER — TRANSCRIPTION ENCOUNTER (OUTPATIENT)
Age: 67
End: 2022-09-13

## 2022-09-13 RX ORDER — BLOOD SUGAR DIAGNOSTIC
STRIP MISCELLANEOUS
Qty: 2 | Refills: 3 | Status: ACTIVE | COMMUNITY
Start: 2021-08-10 | End: 1900-01-01

## 2022-09-13 RX ORDER — BLOOD-GLUCOSE METER
W/DEVICE EACH MISCELLANEOUS
Qty: 1 | Refills: 0 | Status: ACTIVE | COMMUNITY
Start: 2021-08-10

## 2022-09-13 RX ORDER — BLOOD-GLUCOSE METER
70 EACH MISCELLANEOUS
Qty: 2 | Refills: 3 | Status: ACTIVE | COMMUNITY
Start: 2021-08-10 | End: 1900-01-01

## 2022-09-13 RX ORDER — INHALER, ASSIST DEVICES
SPACER (EA) MISCELLANEOUS
Qty: 1 | Refills: 5 | Status: ACTIVE | COMMUNITY
Start: 2021-07-19

## 2022-09-13 RX ORDER — LANCETS 33 GAUGE
EACH MISCELLANEOUS
Qty: 2 | Refills: 3 | Status: ACTIVE | COMMUNITY
Start: 2021-08-10 | End: 1900-01-01

## 2022-09-14 ENCOUNTER — TRANSCRIPTION ENCOUNTER (OUTPATIENT)
Age: 67
End: 2022-09-14

## 2022-09-15 ENCOUNTER — TRANSCRIPTION ENCOUNTER (OUTPATIENT)
Age: 67
End: 2022-09-15

## 2022-11-14 ENCOUNTER — APPOINTMENT (OUTPATIENT)
Dept: INTERNAL MEDICINE | Facility: CLINIC | Age: 67
End: 2022-11-14

## 2022-11-14 VITALS
SYSTOLIC BLOOD PRESSURE: 110 MMHG | HEIGHT: 68 IN | HEART RATE: 101 BPM | BODY MASS INDEX: 37.28 KG/M2 | OXYGEN SATURATION: 96 % | WEIGHT: 246 LBS | DIASTOLIC BLOOD PRESSURE: 60 MMHG

## 2022-11-14 PROCEDURE — 99214 OFFICE O/P EST MOD 30 MIN: CPT | Mod: 25

## 2022-11-14 PROCEDURE — 83036 HEMOGLOBIN GLYCOSYLATED A1C: CPT | Mod: QW

## 2022-11-14 NOTE — HISTORY OF PRESENT ILLNESS
[FreeTextEntry1] : follow up [de-identified] : reduced metformin and started crestor last visit - repeat lipid, foot exam, alb/cr ratio\par \par Flu vaccine 11/1/22, second booster done then too. \par \par Feeling well, concerned about cost of Rybelsus. Our clinical pharmacist called pharmacy that states it is covered and can be picked up in under a week.

## 2022-11-14 NOTE — ASSESSMENT
[FreeTextEntry1] : 67F with obesity and intentional weight loss, HTN, asthma and controlled T2DM presents for follow up\par \par 1. T2DM, controlled\par -last a1c: 6.5%, today 8/15/22 - 5.9%, 11/2022: 6.5%\par -urine albumin cr ratio - check today\par -eye exam -02/09/22\par -foot exam - normal today 11/14/2022\par -pneumovax utd \par -statin: rosuvastatin 5 mg daily \par -medications: metformin 1000 mg BID -- reducing to 500 mg BID given improvement in a1c and continue Rybelsus 3 mg. \par \par 2. Obesity - BMI 38\par -pt working on diet/exercise - not tolerating higher dose of rybelsus (side effects)\par \par 3. Asthma, ?ANDREA\par -sleep study pending (on hold given resolution of snoring with weight loss), reviewed notes from Dr. Patel 7/19/22. \par \par 4. HCM\par -ASCVD risk score 8.31% - on crestor\par -cervical cancer screening scheduled in August\par -mammogram - birads 2 08/2022\par -colon cancer screening -up-to-date in 2019. Was normal and to repeat in 10 years\par -HIV/HCV screening negative. \par -pneumovax - up to date\par -TDAP - up to date\par -shingrix- will obtain in pharmacy\par -COVID vaccine - pfizer completed and booster completed 10/18/21. Second booster completed. \par -flu vaccine - 11/1/2022\par \par rtc in . \par

## 2022-11-15 ENCOUNTER — TRANSCRIPTION ENCOUNTER (OUTPATIENT)
Age: 67
End: 2022-11-15

## 2022-11-15 LAB
CHOLEST SERPL-MCNC: 180 MG/DL
CREAT SPEC-SCNC: 28 MG/DL
HDLC SERPL-MCNC: 65 MG/DL
LDLC SERPL CALC-MCNC: 84 MG/DL
MICROALBUMIN 24H UR DL<=1MG/L-MCNC: <1.2 MG/DL
MICROALBUMIN/CREAT 24H UR-RTO: NORMAL MG/G
NONHDLC SERPL-MCNC: 115 MG/DL
TRIGL SERPL-MCNC: 153 MG/DL

## 2022-11-16 ENCOUNTER — TRANSCRIPTION ENCOUNTER (OUTPATIENT)
Age: 67
End: 2022-11-16

## 2022-11-28 ENCOUNTER — RX RENEWAL (OUTPATIENT)
Age: 67
End: 2022-11-28

## 2023-01-09 ENCOUNTER — APPOINTMENT (OUTPATIENT)
Dept: ORTHOPEDIC SURGERY | Facility: CLINIC | Age: 68
End: 2023-01-09
Payer: MEDICARE

## 2023-01-09 VITALS — HEIGHT: 68 IN | WEIGHT: 246 LBS | BODY MASS INDEX: 37.28 KG/M2

## 2023-01-09 PROCEDURE — 99203 OFFICE O/P NEW LOW 30 MIN: CPT | Mod: 25

## 2023-01-09 PROCEDURE — 73564 X-RAY EXAM KNEE 4 OR MORE: CPT | Mod: 50

## 2023-01-09 PROCEDURE — 20611 DRAIN/INJ JOINT/BURSA W/US: CPT | Mod: LT

## 2023-01-09 NOTE — PHYSICAL EXAM
[Right] : right knee [Left] : left knee [] : no lateral joint line tenderness [TWNoteComboBox7] : flexion 100 degrees [de-identified] : extension 3 degrees

## 2023-01-09 NOTE — IMAGING
[Right] : right knee [Moderate tricompartmental OA lateral narrowing] : Moderate tricompartmental OA lateral narrowing [Left] : left knee [All Views] : anteroposterior, lateral, skyline, and anteroposterior standing [advanced tricompartmental OA with medial compartment narrowing and varus alignment] : advanced tricompartmental OA with medial compartment narrowing and varus alignment

## 2023-01-09 NOTE — DISCUSSION/SUMMARY
[de-identified] : General Dx discussion\par The patient was advised of the diagnosis. The natural history of the pathology was explained in full to the patient in layman's terms. All questions were answered. The risks and benefits of surgical and non-surgical treatment alternatives were explained in full to the patient. \par \par Case discussed.\par Visco injection for the left,  brace and surgery discussed.\par Gel One injection left knee today tolerated well.\par Lateral  brace for the right.\par Followup as needed. \par \par Entered by MARCELLA Medeiros acting as scribe.\par -\par The documentation recorded by the scribe accurately reflects the service I personally performed and the decisions made by me.\par

## 2023-01-09 NOTE — REASON FOR VISIT
[FreeTextEntry2] : 68yo female with bilateral knee pain since July of 2022, R>L. Had a Gel One injection on the right on 10/7/22 which helped, but pain has returned.

## 2023-01-09 NOTE — HISTORY OF PRESENT ILLNESS
[6] : 6 [5] : 5 [] : no [FreeTextEntry9] : Tylenol  [de-identified] : walking decline  [de-identified] : 10/7/22 [de-identified] : Dr. Garcia [de-identified] : 10/7/22

## 2023-01-09 NOTE — PROCEDURE
[Large Joint Injection] : Large joint injection [Left] : of the left [Knee] : knee [Pain] : pain [Inflammation] : inflammation [X-ray evidence of Osteoarthritis on this or prior visit] : x-ray evidence of Osteoarthritis on this or prior visit [Alcohol] : alcohol [Betadine] : betadine [Ethyl Chloride sprayed topically] : ethyl chloride sprayed topically [Sterile technique used] : sterile technique used [Gel-One (30mg)] : 30mg of Gel-One [] : Patient tolerated procedure well [Call if redness, pain or fever occur] : call if redness, pain or fever occur [Apply ice for 15min out of every hour for the next 12-24 hours as tolerated] : apply ice for 15 minutes out of every hour for the next 12-24 hours as tolerated [Previous OTC use and PT nontherapeutic] : patient has tried OTC's including aspirin, Ibuprofen, Aleve, etc or prescription NSAIDS, and/or exercises at home and/or physical therapy without satisfactory response [Patient had decreased mobility in the joint] : patient had decreased mobility in the joint [Risks, benefits, alternatives discussed / Verbal consent obtained] : the risks benefits, and alternatives have been discussed, and verbal consent was obtained [Altered anatomic landmarks d/t erosive arthritis] : altered anatomic landmarks d/t erosive arthritis [All ultrasound images have been permanently captured and stored accordingly in our picture archiving and communication system] : All ultrasound images have been permanently captured and stored accordingly in our picture archiving and communication system [Visualization of the needle and placement of injection was performed without complication] : visualization of the needle and placement of injection was performed without complication

## 2023-02-13 ENCOUNTER — APPOINTMENT (OUTPATIENT)
Dept: INTERNAL MEDICINE | Facility: CLINIC | Age: 68
End: 2023-02-13
Payer: MEDICARE

## 2023-02-13 VITALS
HEART RATE: 99 BPM | WEIGHT: 252 LBS | BODY MASS INDEX: 38.19 KG/M2 | OXYGEN SATURATION: 98 % | HEIGHT: 68 IN | SYSTOLIC BLOOD PRESSURE: 130 MMHG | DIASTOLIC BLOOD PRESSURE: 74 MMHG

## 2023-02-13 LAB — HBA1C MFR BLD HPLC: 6.8

## 2023-02-13 PROCEDURE — 83036 HEMOGLOBIN GLYCOSYLATED A1C: CPT | Mod: QW

## 2023-02-13 PROCEDURE — 99213 OFFICE O/P EST LOW 20 MIN: CPT | Mod: 25

## 2023-02-13 RX ORDER — CHLORHEXIDINE GLUCONATE, 0.12% ORAL RINSE 1.2 MG/ML
0.12 SOLUTION DENTAL
Qty: 473 | Refills: 0 | Status: COMPLETED | COMMUNITY
Start: 2022-08-23

## 2023-02-13 RX ORDER — IBUPROFEN 600 MG/1
600 TABLET, FILM COATED ORAL
Qty: 21 | Refills: 0 | Status: COMPLETED | COMMUNITY
Start: 2022-08-23

## 2023-02-13 RX ORDER — TIRZEPATIDE 2.5 MG/.5ML
2.5 INJECTION, SOLUTION SUBCUTANEOUS
Qty: 4 | Refills: 1 | Status: COMPLETED | COMMUNITY
Start: 2022-11-14 | End: 2023-02-13

## 2023-02-13 RX ORDER — SEMAGLUTIDE 1.34 MG/ML
2 INJECTION, SOLUTION SUBCUTANEOUS
Qty: 2 | Refills: 3 | Status: COMPLETED | COMMUNITY
Start: 2022-11-14 | End: 2023-02-13

## 2023-02-13 RX ORDER — AZITHROMYCIN 250 MG/1
250 TABLET, FILM COATED ORAL
Qty: 6 | Refills: 0 | Status: COMPLETED | COMMUNITY
Start: 2022-08-23

## 2023-02-13 RX ORDER — COVID-19 MOLECULAR TEST ASSAY
KIT MISCELLANEOUS
Qty: 8 | Refills: 0 | Status: COMPLETED | COMMUNITY
Start: 2022-11-01

## 2023-02-13 NOTE — ASSESSMENT
[FreeTextEntry1] : 67F with obesity and intentional weight loss, HTN, asthma and controlled T2DM presents for follow up\par \par 1. T2DM, controlled\par -last a1c: 6.5%, today 8/15/22 - 5.9%, 11/2022: 6.5%, 02/2023: 6.8%\par -urine albumin cr ratio - check today\par -eye exam -02/09/22\par -foot exam - normal 11/14/2022\par -pneumovax utd \par -statin: rosuvastatin 5 mg daily \par -medications: metformin 500 BID (pt was having diarrhea on 1000 mg BID), Rybelsus increased to 7 mg daily \par \par 2. Obesity - BMI 38\par -pt working on diet/exercise - not tolerating higher dose of rybelsus (side effects)\par \par 3. Asthma, ?ANDREA\par -sleep study pending (on hold given resolution of snoring with weight loss), reviewed notes from Dr. Patel 7/19/22. \par \par 4. HCM\par -ASCVD risk score 8.31% - on crestor\par -cervical cancer screening scheduled in August\par -mammogram - birads 2 08/2022\par -colon cancer screening -up-to-date in 2019. Was normal and to repeat in 10 years\par -HIV/HCV screening negative. \par -pneumovax - up to date\par -TDAP - up to date\par -shingrix- will obtain in pharmacy\par -COVID vaccine - pfizer completed and booster completed 10/18/21. Second booster completed. \par -flu vaccine - 11/1/2022\par \par rtc in . \par

## 2023-02-13 NOTE — HISTORY OF PRESENT ILLNESS
[FreeTextEntry1] : follow up  [de-identified] : patient reports some weight gain over holidays. otherwise doing well\par had tried rybelsus 7 mg in the past and it was ok\par willing to try again\par going to get shingrix in pharmacy

## 2023-03-13 ENCOUNTER — TRANSCRIPTION ENCOUNTER (OUTPATIENT)
Age: 68
End: 2023-03-13

## 2023-03-14 ENCOUNTER — TRANSCRIPTION ENCOUNTER (OUTPATIENT)
Age: 68
End: 2023-03-14

## 2023-03-22 ENCOUNTER — TRANSCRIPTION ENCOUNTER (OUTPATIENT)
Age: 68
End: 2023-03-22

## 2023-03-23 ENCOUNTER — APPOINTMENT (OUTPATIENT)
Dept: OPHTHALMOLOGY | Facility: CLINIC | Age: 68
End: 2023-03-23
Payer: MEDICARE

## 2023-03-23 ENCOUNTER — NON-APPOINTMENT (OUTPATIENT)
Age: 68
End: 2023-03-23

## 2023-03-23 PROCEDURE — 92014 COMPRE OPH EXAM EST PT 1/>: CPT

## 2023-05-31 ENCOUNTER — APPOINTMENT (OUTPATIENT)
Dept: INTERNAL MEDICINE | Facility: CLINIC | Age: 68
End: 2023-05-31
Payer: MEDICARE

## 2023-05-31 VITALS
SYSTOLIC BLOOD PRESSURE: 130 MMHG | HEART RATE: 97 BPM | BODY MASS INDEX: 38.47 KG/M2 | WEIGHT: 253 LBS | OXYGEN SATURATION: 97 % | DIASTOLIC BLOOD PRESSURE: 72 MMHG

## 2023-05-31 DIAGNOSIS — E66.9 OBESITY, UNSPECIFIED: ICD-10-CM

## 2023-05-31 PROCEDURE — 99213 OFFICE O/P EST LOW 20 MIN: CPT

## 2023-05-31 NOTE — HISTORY OF PRESENT ILLNESS
[FreeTextEntry1] : follow up [de-identified] : 67F with obesity and intentional weight loss, HTN, asthma and controlled T2DM presents for follow up\par Reports significant stress due to daughter being let go from her job due to recent cuts. She was not exercising/eating as she was previously and noticed elevated blood sugars. Her daughter is about to start a new job now and she is ready to restart healthy lifestyle changes. \par She is going to have labs/a1c done soon as she has [facility] follow up planned in the next month or so. \par

## 2023-05-31 NOTE — ASSESSMENT
[FreeTextEntry1] : 67F with obesity and intentional weight loss, HTN, asthma and controlled T2DM presents for follow up\par \par 1. T2DM, controlled\par -last a1c: 6.5%, today 8/15/22 - 5.9%, 11/2022: 6.5%, 02/2023: 6.8%\par -urine albumin cr ratio - neg 11/2022\par -eye exam -03/2023 no retinopathy\par -foot exam - normal 11/14/2022\par -pneumovax utd \par -statin: rosuvastatin 5 mg daily \par -medications: metformin 500 BID (pt was having diarrhea on 1000 mg BID), Rybelsus 7 mg daily \par \par 2. Obesity - BMI 38\par -pt working on diet/exercise - and continued on rybelsus\par \par 3. HCM\par -ASCVD risk score 8.31% - on crestor every other day. \par -cervical cancer screening scheduled in August\par -mammogram - birads 2 08/2022\par -colon cancer screening -up-to-date in 2019. Was normal and to repeat in 10 years\par -HIV/HCV screening negative. 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 Second booster completed. \par -flu vaccine - 11/1/2022\par \par rtc in . \par

## 2023-06-05 ENCOUNTER — TRANSCRIPTION ENCOUNTER (OUTPATIENT)
Age: 68
End: 2023-06-05

## 2023-06-30 ENCOUNTER — APPOINTMENT (OUTPATIENT)
Dept: OTHER | Facility: CLINIC | Age: 68
End: 2023-06-30
Payer: COMMERCIAL

## 2023-06-30 VITALS
OXYGEN SATURATION: 96 % | HEIGHT: 68 IN | DIASTOLIC BLOOD PRESSURE: 81 MMHG | RESPIRATION RATE: 17 BRPM | SYSTOLIC BLOOD PRESSURE: 128 MMHG | HEART RATE: 86 BPM | WEIGHT: 254 LBS | TEMPERATURE: 97.6 F | BODY MASS INDEX: 38.49 KG/M2

## 2023-06-30 PROCEDURE — 99397 PER PM REEVAL EST PAT 65+ YR: CPT | Mod: 25

## 2023-06-30 PROCEDURE — 99213 OFFICE O/P EST LOW 20 MIN: CPT | Mod: 25

## 2023-06-30 PROCEDURE — 94010 BREATHING CAPACITY TEST: CPT

## 2023-06-30 NOTE — DISCUSSION/SUMMARY
[Patient seen for WTC Monitoring ___] : Patient was seen for WTC monitoring [unfilled] [Please See Note in Chart and Documentation in Trial DB] : Please see note in chart and documentation in Trial DB. [FreeTextEntry3] : 66 yo female, retired from [place] \par \par \par \par She quit smoking over 20  years ago, smoked 15-17 years, about 0.5 PPD. She currently works as a massage therapist per anh.\par \par \par \par \par Ms. 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 She was stationed [facility] AND [facility]. \par \par ROS in trial DB \par PE: in trial DB \par Spirometry: mild restriction, no change \par A/P: Annual WTC MV\par \par  labs  ordered \par  see Occ MEd follow up note \par  mammogram ordered \par \par \par American Cancer Society Guidelines: \par \par Women age 45 to 54 should get mammograms every year.\par Women 55 and older should switch to mammograms every 2 years, or can continue yearly screening.\par \par USPFT recommendation / CDC follows this  recommendations/  : \par The USPSTF recommends biennial screening mammography for women 50-74 years. pt opted for annual breast cancer screening \par  pt opted annual mammogram

## 2023-06-30 NOTE — PAST MEDICAL HISTORY
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 She was stationed [facility] AND [facility].

## 2023-06-30 NOTE — REASON FOR VISIT
[Follow-Up] : a follow-up visit [FreeTextEntry1] : asthma certified by [employer] as WTC related/ exacerbated by WTC exposure

## 2023-06-30 NOTE — HISTORY OF PRESENT ILLNESS
[FreeTextEntry1] : using maintenance inhaler once a day 2 puffs - Pulmicort Flexhaler once a day \par \par  using of rescue inhaler 3-4 times per year  with improvement of cough\par not  waking up at night  to use rescue inhaler \par  no wheezing \par  SOB only when exposed to very hot and humid conditions \par  no CP \par no allergy Sx\par no snoring \par  no heartburn \par NO ER visits in the past 10 years\par She was seeing Dr Rodriguez Pulmonologist in the past \par evaluated by Dr Frazier 06 18 2019\par had PFT- mild restriction \par spirometry today showed mild restriction \par  Xopenex HFA does not cause her to be jittery \par \par \par \par \par \par \par \par \par \par Spirometry : deferred \par \par \par

## 2023-06-30 NOTE — PAST MEDICAL HISTORY
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 She was stationed [facility] AND [facility].

## 2023-06-30 NOTE — DISCUSSION/SUMMARY
[Patient seen for WTC Monitoring ___] : Patient was seen for WTC monitoring [unfilled] [Please See Note in Chart and Documentation in Trial DB] : Please see note in chart and documentation in Trial DB. [FreeTextEntry3] : 68 yo female, retired from [place] \par \par \par \par She quit smoking over 20  years ago, smoked 15-17 years, about 0.5 PPD. She currently works as a massage therapist per anh.\par \par \par \par \par Ms. 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 She was stationed [facility] AND [facility]. \par \par ROS in trial DB \par PE: in trial DB \par Spirometry: mild restriction, no change \par A/P: Annual WTC MV\par \par  labs  ordered \par  see Occ MEd follow up note \par  mammogram ordered \par \par \par American Cancer Society Guidelines: \par \par Women age 45 to 54 should get mammograms every year.\par Women 55 and older should switch to mammograms every 2 years, or can continue yearly screening.\par \par USPFT recommendation / CDC follows this  recommendations/  : \par The USPSTF recommends biennial screening mammography for women 50-74 years. pt opted for annual breast cancer screening \par  pt opted annual mammogram

## 2023-06-30 NOTE — ASSESSMENT
[FreeTextEntry1] : asthma-  well controlled with current regiment \par \par continue   maintenance inhaler Pulmicort 90 mcg 2 puffs once  a day \par \par allergic rhinitis- on Claritin  10 mg daily\par \par

## 2023-06-30 NOTE — HEALTH RISK ASSESSMENT
[Patient reported mammogram was normal] : Patient reported mammogram was normal [Patient reported PAP Smear was normal] : Patient reported PAP Smear was normal [Patient reported colonoscopy was normal] : Patient reported colonoscopy was normal [MammogramDate] : 2022 [PapSmearDate] : 2021 [ColonoscopyDate] : 2019

## 2023-07-03 LAB
ALBUMIN SERPL ELPH-MCNC: 4.7 G/DL
ALP BLD-CCNC: 55 U/L
ALT SERPL-CCNC: 10 U/L
ANION GAP SERPL CALC-SCNC: 14 MMOL/L
APPEARANCE: CLEAR
AST SERPL-CCNC: 14 U/L
BACTERIA: NEGATIVE /HPF
BILIRUB SERPL-MCNC: 0.6 MG/DL
BILIRUBIN URINE: NEGATIVE
BLOOD URINE: NEGATIVE
BUN SERPL-MCNC: 11 MG/DL
CALCIUM SERPL-MCNC: 10 MG/DL
CAST: 0 /LPF
CHLORIDE SERPL-SCNC: 100 MMOL/L
CHOLEST SERPL-MCNC: 150 MG/DL
CO2 SERPL-SCNC: 26 MMOL/L
COLOR: YELLOW
CREAT SERPL-MCNC: 0.77 MG/DL
EGFR: 86 ML/MIN/1.73M2
EPITHELIAL CELLS: 1 /HPF
GLUCOSE QUALITATIVE U: NEGATIVE MG/DL
GLUCOSE SERPL-MCNC: 100 MG/DL
HDLC SERPL-MCNC: 64 MG/DL
KETONES URINE: NEGATIVE MG/DL
LDLC SERPL CALC-MCNC: 60 MG/DL
LEUKOCYTE ESTERASE URINE: ABNORMAL
MICROSCOPIC-UA: NORMAL
NITRITE URINE: NEGATIVE
NONHDLC SERPL-MCNC: 86 MG/DL
PH URINE: 6
POTASSIUM SERPL-SCNC: 4.2 MMOL/L
PROT SERPL-MCNC: 7.5 G/DL
PROTEIN URINE: NEGATIVE MG/DL
RED BLOOD CELLS URINE: 0 /HPF
SODIUM SERPL-SCNC: 140 MMOL/L
SPECIFIC GRAVITY URINE: 1.01
TRIGL SERPL-MCNC: 130 MG/DL
UROBILINOGEN URINE: 0.2 MG/DL
WHITE BLOOD CELLS URINE: 1 /HPF

## 2023-08-09 ENCOUNTER — TRANSCRIPTION ENCOUNTER (OUTPATIENT)
Age: 68
End: 2023-08-09

## 2023-10-09 ENCOUNTER — RESULT REVIEW (OUTPATIENT)
Age: 68
End: 2023-10-09

## 2023-10-09 ENCOUNTER — APPOINTMENT (OUTPATIENT)
Dept: MAMMOGRAPHY | Facility: IMAGING CENTER | Age: 68
End: 2023-10-09
Payer: COMMERCIAL

## 2023-10-09 ENCOUNTER — OUTPATIENT (OUTPATIENT)
Dept: OUTPATIENT SERVICES | Facility: HOSPITAL | Age: 68
LOS: 1 days | End: 2023-10-09
Payer: COMMERCIAL

## 2023-10-09 DIAGNOSIS — Z04.9 ENCOUNTER FOR EXAMINATION AND OBSERVATION FOR UNSPECIFIED REASON: ICD-10-CM

## 2023-10-09 DIAGNOSIS — Z00.8 ENCOUNTER FOR OTHER GENERAL EXAMINATION: ICD-10-CM

## 2023-10-09 PROCEDURE — 77063 BREAST TOMOSYNTHESIS BI: CPT

## 2023-10-09 PROCEDURE — 77067 SCR MAMMO BI INCL CAD: CPT | Mod: 26

## 2023-10-09 PROCEDURE — 77067 SCR MAMMO BI INCL CAD: CPT

## 2023-10-09 PROCEDURE — 77063 BREAST TOMOSYNTHESIS BI: CPT | Mod: 26

## 2023-11-12 PROBLEM — Z01.818 PREOP TESTING: Status: RESOLVED | Noted: 2021-08-27 | Resolved: 2023-11-12

## 2023-11-12 PROBLEM — Z12.11 SCREEN FOR COLON CANCER: Status: RESOLVED | Noted: 2019-07-30 | Resolved: 2023-11-12

## 2023-11-13 ENCOUNTER — OUTPATIENT (OUTPATIENT)
Dept: OUTPATIENT SERVICES | Facility: HOSPITAL | Age: 68
LOS: 1 days | End: 2023-11-13
Payer: MEDICARE

## 2023-11-13 ENCOUNTER — APPOINTMENT (OUTPATIENT)
Dept: INTERNAL MEDICINE | Facility: CLINIC | Age: 68
End: 2023-11-13
Payer: MEDICARE

## 2023-11-13 VITALS
HEART RATE: 97 BPM | HEIGHT: 68 IN | OXYGEN SATURATION: 95 % | BODY MASS INDEX: 37.59 KG/M2 | SYSTOLIC BLOOD PRESSURE: 124 MMHG | DIASTOLIC BLOOD PRESSURE: 80 MMHG | WEIGHT: 248 LBS

## 2023-11-13 DIAGNOSIS — Z03.89 ENCOUNTER FOR OBSERVATION FOR OTHER SUSPECTED DISEASES AND CONDITIONS RULED OUT: ICD-10-CM

## 2023-11-13 DIAGNOSIS — Z04.9 ENCOUNTER FOR EXAMINATION AND OBSERVATION FOR UNSPECIFIED REASON: ICD-10-CM

## 2023-11-13 DIAGNOSIS — Z00.00 ENCOUNTER FOR GENERAL ADULT MEDICAL EXAMINATION W/OUT ABNORMAL FINDINGS: ICD-10-CM

## 2023-11-13 DIAGNOSIS — Z80.49 FAMILY HISTORY OF MALIGNANT NEOPLASM OF OTHER GENITAL ORGANS: ICD-10-CM

## 2023-11-13 DIAGNOSIS — Z01.818 ENCOUNTER FOR OTHER PREPROCEDURAL EXAMINATION: ICD-10-CM

## 2023-11-13 DIAGNOSIS — E78.00 PURE HYPERCHOLESTEROLEMIA, UNSPECIFIED: ICD-10-CM

## 2023-11-13 DIAGNOSIS — I10 ESSENTIAL (PRIMARY) HYPERTENSION: ICD-10-CM

## 2023-11-13 DIAGNOSIS — Z80.1 FAMILY HISTORY OF MALIGNANT NEOPLASM OF TRACHEA, BRONCHUS AND LUNG: ICD-10-CM

## 2023-11-13 DIAGNOSIS — Z12.11 ENCOUNTER FOR SCREENING FOR MALIGNANT NEOPLASM OF COLON: ICD-10-CM

## 2023-11-13 PROCEDURE — G0439: CPT

## 2023-11-13 RX ORDER — MULTIVIT-MIN/IRON/FOLIC ACID/K 18-600-40
50 MCG CAPSULE ORAL
Refills: 0 | Status: ACTIVE | COMMUNITY

## 2023-11-13 RX ORDER — LORATADINE 10 MG/1
10 TABLET ORAL
Qty: 90 | Refills: 3 | Status: DISCONTINUED | COMMUNITY
Start: 2021-05-19 | End: 2023-11-13

## 2023-11-13 RX ORDER — LEVALBUTEROL TARTRATE 45 UG/1
45 AEROSOL, METERED ORAL
Refills: 0 | Status: DISCONTINUED | COMMUNITY
End: 2023-11-13

## 2023-11-13 RX ORDER — UBIDECARENONE 200 MG
CAPSULE ORAL
Refills: 0 | Status: ACTIVE | COMMUNITY

## 2023-11-13 RX ORDER — CLEMASTINE FUMARATE 1.34 MG
TABLET ORAL
Refills: 0 | Status: DISCONTINUED | COMMUNITY
End: 2023-11-13

## 2023-11-18 ENCOUNTER — TRANSCRIPTION ENCOUNTER (OUTPATIENT)
Age: 68
End: 2023-11-18

## 2023-11-18 LAB
CREAT SPEC-SCNC: 68 MG/DL
ESTIMATED AVERAGE GLUCOSE: 137 MG/DL
HBA1C MFR BLD HPLC: 6.4 %
MICROALBUMIN 24H UR DL<=1MG/L-MCNC: <1.2 MG/DL
MICROALBUMIN/CREAT 24H UR-RTO: NORMAL MG/G
TSH SERPL-ACNC: 1.6 UIU/ML
VIT B12 SERPL-MCNC: 349 PG/ML

## 2023-11-20 ENCOUNTER — TRANSCRIPTION ENCOUNTER (OUTPATIENT)
Age: 68
End: 2023-11-20

## 2023-11-20 DIAGNOSIS — E11.9 TYPE 2 DIABETES MELLITUS WITHOUT COMPLICATIONS: ICD-10-CM

## 2023-11-20 DIAGNOSIS — E78.00 PURE HYPERCHOLESTEROLEMIA, UNSPECIFIED: ICD-10-CM

## 2023-11-20 DIAGNOSIS — Z00.00 ENCOUNTER FOR GENERAL ADULT MEDICAL EXAMINATION WITHOUT ABNORMAL FINDINGS: ICD-10-CM

## 2023-11-20 DIAGNOSIS — J45.991 COUGH VARIANT ASTHMA: ICD-10-CM

## 2023-11-20 RX ORDER — ROSUVASTATIN CALCIUM 5 MG/1
5 TABLET, FILM COATED ORAL
Qty: 45 | Refills: 3 | Status: ACTIVE | COMMUNITY
Start: 2022-08-15 | End: 1900-01-01

## 2023-11-30 ENCOUNTER — APPOINTMENT (OUTPATIENT)
Dept: OPHTHALMOLOGY | Facility: CLINIC | Age: 68
End: 2023-11-30
Payer: MEDICARE

## 2023-11-30 ENCOUNTER — NON-APPOINTMENT (OUTPATIENT)
Age: 68
End: 2023-11-30

## 2023-11-30 PROCEDURE — 92133 CPTRZD OPH DX IMG PST SGM ON: CPT

## 2023-11-30 PROCEDURE — 92012 INTRM OPH EXAM EST PATIENT: CPT

## 2023-11-30 PROCEDURE — 92083 EXTENDED VISUAL FIELD XM: CPT

## 2023-12-04 ENCOUNTER — OUTPATIENT (OUTPATIENT)
Dept: OUTPATIENT SERVICES | Facility: HOSPITAL | Age: 68
LOS: 1 days | End: 2023-12-04
Payer: MEDICARE

## 2023-12-04 ENCOUNTER — APPOINTMENT (OUTPATIENT)
Dept: INTERNAL MEDICINE | Facility: CLINIC | Age: 68
End: 2023-12-04

## 2023-12-04 DIAGNOSIS — E11.9 TYPE 2 DIABETES MELLITUS WITHOUT COMPLICATIONS: ICD-10-CM

## 2023-12-04 DIAGNOSIS — J45.991 COUGH VARIANT ASTHMA: ICD-10-CM

## 2023-12-04 DIAGNOSIS — Z00.00 ENCOUNTER FOR GENERAL ADULT MEDICAL EXAMINATION WITHOUT ABNORMAL FINDINGS: ICD-10-CM

## 2023-12-04 DIAGNOSIS — E78.00 PURE HYPERCHOLESTEROLEMIA, UNSPECIFIED: ICD-10-CM

## 2023-12-04 DIAGNOSIS — Z23 ENCOUNTER FOR IMMUNIZATION: ICD-10-CM

## 2023-12-04 PROCEDURE — 90471 IMMUNIZATION ADMIN: CPT

## 2023-12-04 PROCEDURE — 90677 PCV20 VACCINE IM: CPT

## 2023-12-04 PROCEDURE — G0009: CPT

## 2023-12-27 ENCOUNTER — APPOINTMENT (OUTPATIENT)
Dept: INTERNAL MEDICINE | Facility: CLINIC | Age: 68
End: 2023-12-27

## 2023-12-28 ENCOUNTER — APPOINTMENT (OUTPATIENT)
Dept: RADIOLOGY | Facility: CLINIC | Age: 68
End: 2023-12-28
Payer: MEDICARE

## 2023-12-28 PROCEDURE — 77085 DXA BONE DENSITY AXL VRT FX: CPT

## 2023-12-28 RX ORDER — ZOSTER VACCINE RECOMBINANT, ADJUVANTED 50 MCG/0.5
50 KIT INTRAMUSCULAR
Qty: 1 | Refills: 1 | Status: ACTIVE | COMMUNITY
Start: 2023-12-28 | End: 1900-01-01

## 2024-01-13 ENCOUNTER — TRANSCRIPTION ENCOUNTER (OUTPATIENT)
Age: 69
End: 2024-01-13

## 2024-01-13 NOTE — PAST MEDICAL HISTORY
[FreeTextEntry1] : vaginal delivery, asthma flared during pregnancy/delivery, no postmenopausal vaginal bleeding, no uterine or ovarian abnl, had abnl pap in the past and had cryosurgery and then subsequent ones nl, no h/o STDs, gyn Dr. Higginbotham

## 2024-01-13 NOTE — HEALTH RISK ASSESSMENT
[MammogramDate] : 10/23 [PapSmearDate] : 08/23 [BoneDensityComments] : not in many years [ColonoscopyDate] : 09/19 [AdvancecareDate] : 11/23 [ColonoscopyComments] : due 10 years

## 2024-01-13 NOTE — ASSESSMENT
[FreeTextEntry1] : 69 yo female with h/o as above including controlled DM, hyperlipidemia, asthma, WTC exposure, here for CPE. 1.  CV - bp at goal, lipids recently at goal on statin 2.  Endo - check a1c and microalbumin; sees optho, foot exam nl, check vitamin b12 on metformin, tsh for obesity; rx dexa given for age 3.  GI - colonoscopy utd 4.  Gyn - pap and mammo utd 5.  HCM - had recent cbc and cmp done, check other labs as below; consider prevnar 20 and shingrix, other vaccines utd 6.  RTO 6 months DM f/up visit

## 2024-01-13 NOTE — REVIEW OF SYSTEMS
[Fever] : no fever [Chills] : no chills [Fatigue] : no fatigue [Recent Change In Weight] : ~T no recent weight change [Chest Pain] : no chest pain [Palpitations] : no palpitations [Shortness Of Breath] : no shortness of breath [Cough] : no cough [Dyspnea on Exertion] : no dyspnea on exertion [Abdominal Pain] : no abdominal pain [Nausea] : no nausea [Constipation] : no constipation [Diarrhea] : diarrhea [Vomiting] : no vomiting [Heartburn] : no heartburn [Melena] : no melena [Dysuria] : no dysuria [Vaginal Discharge] : no vaginal discharge [Skin Rash] : no skin rash [Headache] : no headache [Dizziness] : no dizziness [Fainting] : no fainting [Anxiety] : no anxiety [Depression] : no depression [Easy Bleeding] : no easy bleeding [Easy Bruising] : no easy bruising [FreeTextEntry2] : diet is good, doesn't have as much of an appetite so eating less, tries to watch carbs, will get back to exercise [FreeTextEntry3] : sees optho [FreeTextEntry7] : had GI bug yesterday with vomiting/diarrhea, certain foods trigger diarrhea [FreeTextEntry9] : see hpi

## 2024-01-13 NOTE — PHYSICAL EXAM
[de-identified] : some cerumen in auditory canals [de-identified] : hyperpigmented seborrheic keratoses on back and abdomen; fleshy soft subcutaneous nodule left mid-back

## 2024-01-13 NOTE — HISTORY OF PRESENT ILLNESS
[FreeTextEntry1] : physical [de-identified] : 69 yo female with h/o as below here for cpe and to establish with new pcp.   Has been having knee pain, told has arthritis, started liquid turmeric, helping.  Taking coq10 as well which helps her need to take less tylenol and aleve.  Had gel injections in both knees.  Ortho said not ready yet for knee replacement.  Will try to do aqua aerobics to help with exercise. No other active issues, feeling well.  Asthma under control, uses rescue inhaler prn.  Was in 911 exposure. Hasn't been checking FSG as much, FSG was lowest 80, highest 130,  on average, no FSG > 200.  No polydypsia.  Sees optho Dr. Wu, Dr. Toussaint for glaucoma as well (for monitoring).  Has to see podiatrist.

## 2024-01-13 NOTE — ADDENDUM
[FreeTextEntry1] : 11/18/23:  Labs nl, A1c 6.4 at goal - mailed to pt. 1/13/24:  Dexa nl - messaged to pt.

## 2024-01-22 ENCOUNTER — NON-APPOINTMENT (OUTPATIENT)
Age: 69
End: 2024-01-22

## 2024-01-22 ENCOUNTER — APPOINTMENT (OUTPATIENT)
Dept: PHYSICAL MEDICINE AND REHAB | Facility: CLINIC | Age: 69
End: 2024-01-22
Payer: MEDICARE

## 2024-01-22 VITALS — HEART RATE: 98 BPM | DIASTOLIC BLOOD PRESSURE: 84 MMHG | SYSTOLIC BLOOD PRESSURE: 127 MMHG | OXYGEN SATURATION: 96 %

## 2024-01-22 PROCEDURE — 99204 OFFICE O/P NEW MOD 45 MIN: CPT

## 2024-01-22 NOTE — ASSESSMENT
[FreeTextEntry1] : Patient is a 67-year-old right-hand-dominant female history of DM 2, asthma, history of right fibula/ankle ORIF (1993), bilateral knee osteoarthritis/right genu valgum who presents today with flare of bilateral knee osteoarthritis.  Prescription provided for diclofenac 75 mg p.o. twice daily with food, side effects and drug interactions reviewed.  Patient may continue taking Tylenol as needed.  Will initiate course of outpatient physical therapy 2-3 times per week, see Rx.  Will seek authorization for a series of 3 Hyalgan injections to both knees.  I spent a total of 45 minutes on the date of the encounter evaluating and treating the patient including a discussion of treatment options.

## 2024-01-22 NOTE — HISTORY OF PRESENT ILLNESS
[FreeTextEntry1] : Patient is a 67-year-old right-hand-dominant female history of DM 2, asthma, history of right fibula/ankle ORIF (1993), bilateral knee osteoarthritis who presents today with main complaint of bilateral knee pain and stiffness over the past 2 years.  No fall or trauma reported.  No pain at rest.  Pain exacerbated the most with weightbearing activities and somewhat at night.  Patient complains of knee stiffness and discomfort in the morning.  Pain greater on the right than the left.  Patient describes the pain as a dull ache which is present anteriorly at the left knee, anterior/lateral knee pain on the right.  Pain score up to 5-6/10.  Patient had x-ray of the right knee which demonstrated moderate osteoarthritis, left knee x-ray showed advanced osteoarthritis.  Patient had gel injection to the right knee in October, 2022 in the left knee in January, 2023.  Patient has been taking Tylenol/Advil as needed.  No steroid injections, no physical therapy.  Patient reports receiving an  knee brace which she could not tolerate.  Patient denies focal motor weakness, numbness or bowel bladder incontinence.

## 2024-01-22 NOTE — REVIEW OF SYSTEMS
[Patient Intake Form Reviewed] : Patient intake form was reviewed [Joint Pain] : joint pain [Joint Stiffness] : joint stiffness [Difficulty Walking] : difficulty walking [Negative] : Gastrointestinal [Fever] : no fever [Incontinence] : no incontinence [Muscle Weakness] : no muscle weakness

## 2024-01-22 NOTE — PHYSICAL EXAM
[facility] Fall Risk Assessment    Patient Name:  Debi Rodrigez  : 1978    Risk Factor Scale  Score   History of Falls [x] Yes  [] No 25  0 25   Secondary Diagnosis [] Yes  [x] No 15  0 0   Ambulatory Aid [] Furniture  [] Crutches/cane/walker  [x] None/bedrest/wheelchair/nurse 30  15  0 0   IV/Heparin Lock [] Yes  [x] No 20  0 0   Gait/Transferring [] Impaired  [] Weak  [x] Normal/bedrest/immobile 20  10  0 0   Mental Status [] Forgets limitations  [x] Oriented to own ability 15  0 0      Total: 25     Based on the Assessment score: check the appropriate box.     []  No intervention needed   Low =   Score of 0-24    [x]  Use standard prevention interventions Moderate =  Score of 24-44   [x] Give patient handout and discuss fall prevention strategies   [x] Establish goal of education for patient/family RE: fall prevention strategies    []  Use high risk prevention interventions High = Score of 45 and higher   [] Give patient handout and discuss fall prevention strategies   [] Establish goal of education for patient/family Re: fall prevention strategies   [] Discuss lifeline / other resources    Electronically signed by:   Arjun Pineda PT  Date: 2022 [FreeTextEntry1] : General: Well-developed female in no apparent distress.  Patient is awake, alert and oriented x 3.  Cooperative HEENT: Normocephalic, atraumatic.  MMM Lungs: Clear to auscultation. Cardiac: Regular rate and rhythm. Abdomen: Bowel sounds present, nondistended. Extremities: No pedal edema.  Right knee examination no joint effusion, minimal crepitus.  Genu valgum present which is partially reducible.  Negative anterior drawer sign, negative Lachman sign, mild laxity on valgus stress.  No tenderness to palpation at the medial/lateral joint space, no tenderness to palpation at the patellar or quadriceps tendon.  No tenderness to palpation at the Pez anserine bursa.  No erythema or warmth noted. Knee extension -10 degrees passively and actively  Left knee examination: No joint effusion, minimal crepitus.  No tenderness to palpation at the medial/lateral joint spaces.  No tenderness to palpation at the patellar or quadriceps tendon.  Mild tenderness to palpation at the left pes anserine bursa.  Negative Lachman sign, no ligamentous laxity.  No erythema or warmth noted.  Full active range of motion at the knee. Knee extension -5 degrees passively and actively.  Motor: Both upper extremities: Tone normal, active range of motion within functional limits with 5/5 motor power throughout.  Thumb to digit opposition intact bilaterally Both lower extremities: Tone normal, active range of motion within functional limits with 5/5 motor power throughout.  Sensory: Intact to light touch in both lower extremities Muscle stretch reflexes:/+1 KJ bilaterally.  Functional status: Patient ambulates independently without an assistive device with both knees in flexed approximately 10 degrees throughout stance phase.  Antalgic gait noted.

## 2024-01-29 ENCOUNTER — APPOINTMENT (OUTPATIENT)
Dept: PHYSICAL MEDICINE AND REHAB | Facility: CLINIC | Age: 69
End: 2024-01-29
Payer: MEDICARE

## 2024-01-29 PROCEDURE — 20610 DRAIN/INJ JOINT/BURSA W/O US: CPT | Mod: RT

## 2024-01-29 NOTE — PROCEDURE
[Consent] : consent was given by patient or guardian [Site Verification] : the injection site was verified [Post-Injection Instructions Provided] : Post-injection instructions were provided [] : the right [____] : [unfilled] syringes were used [Total Units: ______] : [unfilled] units [de-identified] : Procedural note for MSK injection:  The procedure was explained in detail including associated risks and informed consent was obtained.  Under sterile conditions a right knee intra-articular injection was done via superior/lateral approach with a 22-gauge 1 and half inch needle.  0 cc aspirated.  1 syringe Hyalgan injected.  Tolerated well.  Right knee injection: #1 of 3 Left knee injection: #0 of 3 [TWNoteComboBox2] : Hyalgan [TWNoteComboBox1] : lateral knee

## 2024-01-29 NOTE — ASSESSMENT
[FreeTextEntry1] : Patient is a 68-year-old female history of DM, asthma, history of right fibula/ankle ORIF (1993), bilateral knee osteoarthritis/right genu valgum who underwent a right knee intra-articular injection with Hyalgan, tolerated well.  Right knee injection: #1 of 3 Left knee injection: #0 of 3.

## 2024-02-06 ENCOUNTER — APPOINTMENT (OUTPATIENT)
Dept: PHYSICAL MEDICINE AND REHAB | Facility: CLINIC | Age: 69
End: 2024-02-06
Payer: MEDICARE

## 2024-02-06 VITALS
TEMPERATURE: 98.4 F | DIASTOLIC BLOOD PRESSURE: 82 MMHG | OXYGEN SATURATION: 97 % | SYSTOLIC BLOOD PRESSURE: 124 MMHG | HEART RATE: 91 BPM | RESPIRATION RATE: 14 BRPM

## 2024-02-06 PROCEDURE — 20610 DRAIN/INJ JOINT/BURSA W/O US: CPT | Mod: 50

## 2024-02-06 NOTE — PROCEDURE
[Consent] : consent was given by patient or guardian [Site Verification] : the injection site was verified [Post-Injection Instructions Provided] : Post-injection instructions were provided [] : the left [____] : [unfilled] syringes were used [Total Units: ______] : [unfilled] units [de-identified] : Procedural note for MSK injection:  The procedure was explained in detail including associated risks and informed consent was obtained.  Under sterile conditions a right knee intra-articular injection was done via superior/lateral approach with a 22-gauge 1-1/2" needle and a left knee intra-articular injection via superior/medial approach with a 1-1/2 inch needle. 0 cc aspirated from both knees.  1 syringe Hyalgan injected to both knees.  Tolerated well.  Right knee injection: #2 of 3 Left knee injection: #1 of 3 [TWNoteComboBox2] : Hyalgan [TWNoteComboBox1] : lateral knee [de-identified] : Hyalgan [de-identified] : medial knee

## 2024-02-06 NOTE — ASSESSMENT
[FreeTextEntry1] : Patient is a 68-year-old female history of DM, asthma, history of right fibula/ankle ORIF (1993), bilateral knee osteoarthritis/right genu valgum who underwent a right and left knee intra-articular injection with Hyalgan, tolerated well.  Right knee injection: #2 of 3 Left knee injection: #1 of 3.

## 2024-02-12 ENCOUNTER — APPOINTMENT (OUTPATIENT)
Dept: PHYSICAL MEDICINE AND REHAB | Facility: CLINIC | Age: 69
End: 2024-02-12
Payer: MEDICARE

## 2024-02-12 VITALS
TEMPERATURE: 97.9 F | HEART RATE: 86 BPM | DIASTOLIC BLOOD PRESSURE: 78 MMHG | RESPIRATION RATE: 14 BRPM | SYSTOLIC BLOOD PRESSURE: 127 MMHG | OXYGEN SATURATION: 98 %

## 2024-02-12 DIAGNOSIS — M17.11 UNILATERAL PRIMARY OSTEOARTHRITIS, RIGHT KNEE: ICD-10-CM

## 2024-02-12 PROCEDURE — 20610 DRAIN/INJ JOINT/BURSA W/O US: CPT | Mod: 50

## 2024-02-12 NOTE — PROCEDURE
[Consent] : consent was given by patient or guardian [Site Verification] : the injection site was verified [Post-Injection Instructions Provided] : Post-injection instructions were provided [] : the left [____] : [unfilled] syringes were used [Total Units: ______] : [unfilled] units [de-identified] : Procedural note for MSK injection:  The procedure was explained in detail including associated risks and informed consent was obtained.  Under sterile conditions a right knee intra-articular injection was done via superior/lateral approach with a 22-gauge 1-1/2" needle and a left knee intra-articular injection via superior/medial approach with a 1-1/2 inch needle. 0 cc aspirated from both knees.  1 syringe Hyalgan injected to both knees.  Tolerated well.  Right knee injection: #3 of 3 Left knee injection: #2 of 3 [TWNoteComboBox1] : lateral knee [TWNoteComboBox2] : Hyalgan [de-identified] : Hyalgan [de-identified] : medial knee

## 2024-02-12 NOTE — ASSESSMENT
[FreeTextEntry1] : Patient is a 68-year-old female history of DM, asthma, history of right fibula/ankle ORIF (1993), bilateral knee osteoarthritis/right genu valgum who underwent a right and left knee intra-articular injection with Hyalgan, tolerated well.  Right knee injection: #3 of 3 Left knee injection: #2 of 3.

## 2024-02-23 ENCOUNTER — TRANSCRIPTION ENCOUNTER (OUTPATIENT)
Age: 69
End: 2024-02-23

## 2024-02-27 ENCOUNTER — APPOINTMENT (OUTPATIENT)
Dept: PHYSICAL MEDICINE AND REHAB | Facility: CLINIC | Age: 69
End: 2024-02-27
Payer: MEDICARE

## 2024-02-27 VITALS — SYSTOLIC BLOOD PRESSURE: 131 MMHG | HEART RATE: 85 BPM | OXYGEN SATURATION: 95 % | DIASTOLIC BLOOD PRESSURE: 74 MMHG

## 2024-02-27 PROCEDURE — 20610 DRAIN/INJ JOINT/BURSA W/O US: CPT | Mod: LT

## 2024-02-27 NOTE — PROCEDURE
[Consent] : consent was given by patient or guardian [Site Verification] : the injection site was verified [Post-Injection Instructions Provided] : Post-injection instructions were provided [] : the left [____] : [unfilled] syringes were used [Total Units: ______] : [unfilled] units [de-identified] : Procedural note for MSK injection:  The procedure was explained in detail including associated risks and informed consent was obtained.  Under sterile conditions a left knee intra-articular injection was done via superior/medial approach with a 22-gauge 1-1/2" needle with a 1-1/2 inch needle. 0 cc aspirated. 1 syringe Hyalgan injected. Tolerated well.  Left knee injection: #3 of 3 [TWNoteComboBox2] : Hyalgan [TWNoteComboBox1] : medial knee [de-identified] : False [de-identified] : False

## 2024-02-27 NOTE — ASSESSMENT
[FreeTextEntry1] : Patient is a 68-year-old female history of DM, asthma, history of right fibula/ankle ORIF (1993), bilateral knee osteoarthritis/right genu valgum who underwent a right and left knee intra-articular injection with Hyalgan, tolerated well.  Patient starting outpatient physical therapy and is weaning diclofenac to 1 tablet daily.  Left knee injection: #3 of 3.

## 2024-03-25 ENCOUNTER — TRANSCRIPTION ENCOUNTER (OUTPATIENT)
Age: 69
End: 2024-03-25

## 2024-03-25 RX ORDER — ORAL SEMAGLUTIDE 7 MG/1
7 TABLET ORAL
Qty: 90 | Refills: 3 | Status: ACTIVE | COMMUNITY
Start: 2021-08-10 | End: 1900-01-01

## 2024-04-29 ENCOUNTER — APPOINTMENT (OUTPATIENT)
Dept: PHYSICAL MEDICINE AND REHAB | Facility: CLINIC | Age: 69
End: 2024-04-29
Payer: MEDICARE

## 2024-04-29 DIAGNOSIS — M24.561 CONTRACTURE, RIGHT KNEE: ICD-10-CM

## 2024-04-29 DIAGNOSIS — M24.562 CONTRACTURE, LEFT KNEE: ICD-10-CM

## 2024-04-29 DIAGNOSIS — M17.12 UNILATERAL PRIMARY OSTEOARTHRITIS, LEFT KNEE: ICD-10-CM

## 2024-04-29 PROCEDURE — 99213 OFFICE O/P EST LOW 20 MIN: CPT

## 2024-04-29 NOTE — HISTORY OF PRESENT ILLNESS
[FreeTextEntry1] : Patient is a 68-year-old RHD female history of DM 2, asthma, history of right fibula/ankle ORIF (1993), bilateral knee osteoarthritis who completed her series of 3 Hyalgan injections to both knees in February, 2024.  Patient is receiving outpatient physical therapy twice a week.  Patient states that overall her knees have significantly improved.  Patient can sit to stand transfer without assistance she is now independent on stairs and significantly improved discomfort during ambulation.  Patient does report having some more pain in the mornings which then improves during the day.  Pain score up to 2/10.  Patient will use a straight axis cane in the morning but then discontinues as the day goes on.  Patient is reduce the diclofenac to 75 mg daily and has been tolerating well, Tylenol as needed.

## 2024-04-29 NOTE — ASSESSMENT
[FreeTextEntry1] : Patient is a 68-year-old RHD female history of DM 2, asthma, history of right fibula/ankle ORIF (1993), bilateral knee osteoarthritis/right genu valgum whose bilateral knee pain is significantly improved after a series of 3 Hyalgan injections and physical therapy.  Recommend discontinuing diclofenac in 1 week.  Recommend starting a standing regimen of Tylenol 500 mg p.o. 3 times daily, side effects and drug interactions reviewed.  Recommend discontinuing physical therapy for now and continuing her home exercise program.  I also encouraged the patient to increase use of a straight axis cane, especially when she plans on walking longer distances to help unload her knees.    I spent a total of 20 minutes on the date of the encounter evaluating and treating the patient including a discussion of treatment options.

## 2024-04-29 NOTE — PHYSICAL EXAM
[FreeTextEntry1] : General: Well-developed female in no apparent distress.  Patient is awake, alert and oriented x 3.  Cooperative HEENT: Normocephalic, atraumatic.  MMM Extremities: No pedal edema.  Right knee examination no joint effusion, minimal crepitus.  Genu valgum present which is partially reducible.  Negative anterior drawer sign, negative Lachman sign, mild laxity on valgus stress.  No tenderness to palpation at the medial/lateral joint space, no tenderness to palpation at the patellar or quadriceps tendon.  No tenderness to palpation at the Pez anserine bursa.  No erythema or warmth noted. Knee extension -10 degrees passively and actively  Left knee examination: No joint effusion, minimal crepitus.  No tenderness to palpation at the medial/lateral joint spaces.  No tenderness to palpation at the patellar or quadriceps tendon.  Mild tenderness to palpation at the left pes anserine bursa.  Negative Lachman sign, no ligamentous laxity.  No erythema or warmth noted.  Full active range of motion at the knee. Knee extension -5 degrees passively and actively.  Motor: Both lower extremities: Tone normal, active range of motion within functional limits with 5/5 motor power throughout.  Sensory: Intact to light touch in both lower extremities  Functional status: Patient ambulates independently without an assistive device with both knees in flexed approximately 10 degrees throughout stance phase.

## 2024-05-02 ENCOUNTER — TRANSCRIPTION ENCOUNTER (OUTPATIENT)
Age: 69
End: 2024-05-02

## 2024-05-13 ENCOUNTER — APPOINTMENT (OUTPATIENT)
Dept: INTERNAL MEDICINE | Facility: CLINIC | Age: 69
End: 2024-05-13
Payer: MEDICARE

## 2024-05-13 ENCOUNTER — OUTPATIENT (OUTPATIENT)
Dept: OUTPATIENT SERVICES | Facility: HOSPITAL | Age: 69
LOS: 1 days | End: 2024-05-13
Payer: MEDICARE

## 2024-05-13 VITALS
SYSTOLIC BLOOD PRESSURE: 120 MMHG | BODY MASS INDEX: 38.8 KG/M2 | WEIGHT: 256 LBS | DIASTOLIC BLOOD PRESSURE: 70 MMHG | OXYGEN SATURATION: 98 % | HEIGHT: 68 IN | HEART RATE: 79 BPM

## 2024-05-13 DIAGNOSIS — E11.9 TYPE 2 DIABETES MELLITUS W/OUT COMPLICATIONS: ICD-10-CM

## 2024-05-13 DIAGNOSIS — I10 ESSENTIAL (PRIMARY) HYPERTENSION: ICD-10-CM

## 2024-05-13 PROCEDURE — G2211 COMPLEX E/M VISIT ADD ON: CPT

## 2024-05-13 PROCEDURE — G0463: CPT

## 2024-05-13 PROCEDURE — 83036 HEMOGLOBIN GLYCOSYLATED A1C: CPT

## 2024-05-13 PROCEDURE — 99213 OFFICE O/P EST LOW 20 MIN: CPT

## 2024-05-13 RX ORDER — HYALURONATE SODIUM 10 MG/ML
20 VIAL (ML) INTRAARTICULAR
Qty: 6 | Refills: 0 | Status: DISCONTINUED | OUTPATIENT
Start: 2024-01-22 | End: 2024-05-13

## 2024-05-13 RX ORDER — METFORMIN HYDROCHLORIDE 500 MG/1
500 TABLET, COATED ORAL TWICE DAILY
Qty: 180 | Refills: 3 | Status: DISCONTINUED | COMMUNITY
Start: 2021-08-10 | End: 2024-05-13

## 2024-05-13 RX ORDER — DICLOFENAC SODIUM 75 MG/1
75 TABLET, DELAYED RELEASE ORAL
Qty: 60 | Refills: 0 | Status: DISCONTINUED | COMMUNITY
Start: 2024-01-22 | End: 2024-05-13

## 2024-05-13 RX ORDER — DICLOFENAC SODIUM 75 MG/1
75 TABLET, DELAYED RELEASE ORAL TWICE DAILY
Qty: 60 | Refills: 0 | Status: DISCONTINUED | COMMUNITY
Start: 2024-02-23 | End: 2024-05-13

## 2024-05-13 NOTE — PHYSICAL EXAM
[No Acute Distress] : no acute distress [Well Nourished] : well nourished [Well Developed] : well developed [Well-Appearing] : well-appearing [Supple] : supple [No Respiratory Distress] : no respiratory distress  [No Accessory Muscle Use] : no accessory muscle use [Clear to Auscultation] : lungs were clear to auscultation bilaterally [Normal Rate] : normal rate  [Regular Rhythm] : with a regular rhythm [Normal S1, S2] : normal S1 and S2 [No Murmur] : no murmur heard [No Edema] : there was no peripheral edema [No Joint Swelling] : no joint swelling [No Rash] : no rash [Normal Gait] : normal gait [Normal Affect] : the affect was normal [de-identified] : bp 120/70

## 2024-05-13 NOTE — HISTORY OF PRESENT ILLNESS
[FreeTextEntry1] : DM [de-identified] : 67 yo female with h/o as below including DM here for f/up visit. Had gel shots for knees, also taking one anti-inflammatory during the day and tylenol so less pain in knees. Hadn't been exercising, was doing PT for 2 months.  Will join gym and do water aerobics. Will eat carbs once/day now, less sugar now but still does eat some sweets but will try to balance with what she eats otherwise throughout the day. No other active issues.

## 2024-05-13 NOTE — ASSESSMENT
[FreeTextEntry1] : 67 yo female with h/o as above including DM, hyperlipidemia, obesity, WTC exposure, here for DM f/up visit.  A1c 6.7 at goal but slightly higher than last labs, will increase metformin to 1000 mg q12 if pt can tolerate, c/w diet/exercise/weight loss.  Could consider increasing rybelsus dose in future.  RTO 6 months cpe.

## 2024-05-20 ENCOUNTER — APPOINTMENT (OUTPATIENT)
Dept: OPHTHALMOLOGY | Facility: CLINIC | Age: 69
End: 2024-05-20
Payer: MEDICARE

## 2024-05-20 ENCOUNTER — NON-APPOINTMENT (OUTPATIENT)
Age: 69
End: 2024-05-20

## 2024-05-20 DIAGNOSIS — E11.9 TYPE 2 DIABETES MELLITUS WITHOUT COMPLICATIONS: ICD-10-CM

## 2024-05-20 PROCEDURE — 92014 COMPRE OPH EXAM EST PT 1/>: CPT

## 2024-05-30 ENCOUNTER — TRANSCRIPTION ENCOUNTER (OUTPATIENT)
Age: 69
End: 2024-05-30

## 2024-05-30 RX ORDER — METFORMIN HYDROCHLORIDE 1000 MG/1
1000 TABLET, COATED ORAL
Qty: 180 | Refills: 2 | Status: DISCONTINUED | COMMUNITY
Start: 2024-05-13 | End: 2024-05-30

## 2024-05-30 RX ORDER — METFORMIN HYDROCHLORIDE 500 MG/1
500 TABLET, COATED ORAL
Qty: 180 | Refills: 3 | Status: ACTIVE | COMMUNITY
Start: 1900-01-01 | End: 1900-01-01

## 2024-06-04 ENCOUNTER — TRANSCRIPTION ENCOUNTER (OUTPATIENT)
Age: 69
End: 2024-06-04

## 2024-06-24 RX ORDER — DICLOFENAC SODIUM 75 MG/1
75 TABLET, DELAYED RELEASE ORAL
Qty: 30 | Refills: 0 | Status: ACTIVE | COMMUNITY
Start: 2024-05-02 | End: 1900-01-01

## 2024-06-26 ENCOUNTER — TRANSCRIPTION ENCOUNTER (OUTPATIENT)
Age: 69
End: 2024-06-26

## 2024-06-26 ENCOUNTER — NON-APPOINTMENT (OUTPATIENT)
Age: 69
End: 2024-06-26

## 2024-06-28 ENCOUNTER — APPOINTMENT (OUTPATIENT)
Dept: OTHER | Facility: CLINIC | Age: 69
End: 2024-06-28
Payer: COMMERCIAL

## 2024-06-28 VITALS
HEIGHT: 68 IN | TEMPERATURE: 96.3 F | SYSTOLIC BLOOD PRESSURE: 127 MMHG | HEART RATE: 103 BPM | DIASTOLIC BLOOD PRESSURE: 74 MMHG | BODY MASS INDEX: 38.34 KG/M2 | WEIGHT: 253 LBS | OXYGEN SATURATION: 97 %

## 2024-06-28 DIAGNOSIS — Z04.9 ENCOUNTER FOR EXAMINATION AND OBSERVATION FOR UNSPECIFIED REASON: ICD-10-CM

## 2024-06-28 DIAGNOSIS — J45.991 COUGH VARIANT ASTHMA: ICD-10-CM

## 2024-06-28 PROCEDURE — 94010 BREATHING CAPACITY TEST: CPT

## 2024-06-28 PROCEDURE — 99397 PER PM REEVAL EST PAT 65+ YR: CPT | Mod: 25

## 2024-06-28 PROCEDURE — 99214 OFFICE O/P EST MOD 30 MIN: CPT | Mod: 25

## 2024-07-01 ENCOUNTER — TRANSCRIPTION ENCOUNTER (OUTPATIENT)
Age: 69
End: 2024-07-01

## 2024-07-01 LAB
ALBUMIN SERPL ELPH-MCNC: 4.6 G/DL
ALP BLD-CCNC: 47 U/L
ALT SERPL-CCNC: 10 U/L
ANION GAP SERPL CALC-SCNC: 14 MMOL/L
APPEARANCE: CLEAR
AST SERPL-CCNC: 11 U/L
BACTERIA: ABNORMAL /HPF
BILIRUB SERPL-MCNC: 0.4 MG/DL
BILIRUBIN URINE: NEGATIVE
BLOOD URINE: NEGATIVE
BUN SERPL-MCNC: 10 MG/DL
CALCIUM SERPL-MCNC: 9.6 MG/DL
CAST: 0 /LPF
CHLORIDE SERPL-SCNC: 104 MMOL/L
CHOLEST SERPL-MCNC: 195 MG/DL
CO2 SERPL-SCNC: 24 MMOL/L
COLOR: YELLOW
CREAT SERPL-MCNC: 0.73 MG/DL
EGFR: 91 ML/MIN/1.73M2
EPITHELIAL CELLS: 7 /HPF
GLUCOSE QUALITATIVE U: NEGATIVE MG/DL
GLUCOSE SERPL-MCNC: 90 MG/DL
HCT VFR BLD CALC: 40.5 %
HDLC SERPL-MCNC: 59 MG/DL
HGB BLD-MCNC: 12.3 G/DL
KETONES URINE: NEGATIVE MG/DL
LDLC SERPL CALC-MCNC: 91 MG/DL
LEUKOCYTE ESTERASE URINE: ABNORMAL
MCHC RBC-ENTMCNC: 24.9 PG
MCHC RBC-ENTMCNC: 30.4 GM/DL
MCV RBC AUTO: 82.2 FL
MICROSCOPIC-UA: NORMAL
NITRITE URINE: NEGATIVE
NONHDLC SERPL-MCNC: 136 MG/DL
PH URINE: 6
PLATELET # BLD AUTO: 276 K/UL
POTASSIUM SERPL-SCNC: 4.3 MMOL/L
PROT SERPL-MCNC: 7.3 G/DL
PROTEIN URINE: NEGATIVE MG/DL
RBC # BLD: 4.93 M/UL
RBC # FLD: 15.7 %
RED BLOOD CELLS URINE: 1 /HPF
SODIUM SERPL-SCNC: 141 MMOL/L
SPECIFIC GRAVITY URINE: 1.01
TRIGL SERPL-MCNC: 267 MG/DL
UROBILINOGEN URINE: 0.2 MG/DL
WBC # FLD AUTO: 7.13 K/UL
WHITE BLOOD CELLS URINE: 4 /HPF

## 2024-07-11 ENCOUNTER — APPOINTMENT (OUTPATIENT)
Dept: RADIOLOGY | Facility: IMAGING CENTER | Age: 69
End: 2024-07-11
Payer: COMMERCIAL

## 2024-07-11 ENCOUNTER — OUTPATIENT (OUTPATIENT)
Dept: OUTPATIENT SERVICES | Facility: HOSPITAL | Age: 69
LOS: 1 days | End: 2024-07-11
Payer: COMMERCIAL

## 2024-07-11 ENCOUNTER — TRANSCRIPTION ENCOUNTER (OUTPATIENT)
Age: 69
End: 2024-07-11

## 2024-07-11 DIAGNOSIS — Z04.9 ENCOUNTER FOR EXAMINATION AND OBSERVATION FOR UNSPECIFIED REASON: ICD-10-CM

## 2024-07-11 PROCEDURE — 71046 X-RAY EXAM CHEST 2 VIEWS: CPT

## 2024-07-11 PROCEDURE — 71046 X-RAY EXAM CHEST 2 VIEWS: CPT | Mod: 26

## 2024-10-11 ENCOUNTER — OUTPATIENT (OUTPATIENT)
Dept: OUTPATIENT SERVICES | Facility: HOSPITAL | Age: 69
LOS: 1 days | End: 2024-10-11
Payer: MEDICARE

## 2024-10-11 ENCOUNTER — APPOINTMENT (OUTPATIENT)
Dept: MAMMOGRAPHY | Facility: IMAGING CENTER | Age: 69
End: 2024-10-11
Payer: MEDICARE

## 2024-10-11 DIAGNOSIS — Z00.8 ENCOUNTER FOR OTHER GENERAL EXAMINATION: ICD-10-CM

## 2024-10-11 PROCEDURE — 77067 SCR MAMMO BI INCL CAD: CPT | Mod: 26

## 2024-10-11 PROCEDURE — 77063 BREAST TOMOSYNTHESIS BI: CPT | Mod: 26

## 2024-11-05 ENCOUNTER — RX RENEWAL (OUTPATIENT)
Age: 69
End: 2024-11-05

## 2024-11-18 ENCOUNTER — OUTPATIENT (OUTPATIENT)
Dept: OUTPATIENT SERVICES | Facility: HOSPITAL | Age: 69
LOS: 1 days | End: 2024-11-18

## 2024-11-18 ENCOUNTER — APPOINTMENT (OUTPATIENT)
Dept: INTERNAL MEDICINE | Facility: CLINIC | Age: 69
End: 2024-11-18
Payer: MEDICARE

## 2024-11-18 VITALS
BODY MASS INDEX: 38.32 KG/M2 | SYSTOLIC BLOOD PRESSURE: 124 MMHG | HEART RATE: 107 BPM | OXYGEN SATURATION: 95 % | DIASTOLIC BLOOD PRESSURE: 82 MMHG | WEIGHT: 252 LBS

## 2024-11-18 VITALS — HEART RATE: 96 BPM

## 2024-11-18 DIAGNOSIS — E78.00 PURE HYPERCHOLESTEROLEMIA, UNSPECIFIED: ICD-10-CM

## 2024-11-18 DIAGNOSIS — E11.9 TYPE 2 DIABETES MELLITUS W/OUT COMPLICATIONS: ICD-10-CM

## 2024-11-18 DIAGNOSIS — Z00.00 ENCOUNTER FOR GENERAL ADULT MEDICAL EXAMINATION W/OUT ABNORMAL FINDINGS: ICD-10-CM

## 2024-11-18 DIAGNOSIS — J45.991 COUGH VARIANT ASTHMA: ICD-10-CM

## 2024-11-18 DIAGNOSIS — E66.9 OBESITY, UNSPECIFIED: ICD-10-CM

## 2024-11-18 PROCEDURE — G0439: CPT

## 2024-11-20 PROCEDURE — 77063 BREAST TOMOSYNTHESIS BI: CPT

## 2024-11-20 PROCEDURE — 77067 SCR MAMMO BI INCL CAD: CPT

## 2024-11-21 ENCOUNTER — NON-APPOINTMENT (OUTPATIENT)
Age: 69
End: 2024-11-21

## 2024-11-21 ENCOUNTER — APPOINTMENT (OUTPATIENT)
Dept: OPHTHALMOLOGY | Facility: CLINIC | Age: 69
End: 2024-11-21
Payer: MEDICARE

## 2024-11-21 PROCEDURE — 92012 INTRM OPH EXAM EST PATIENT: CPT

## 2024-11-21 PROCEDURE — 92133 CPTRZD OPH DX IMG PST SGM ON: CPT

## 2024-11-21 PROCEDURE — 92083 EXTENDED VISUAL FIELD XM: CPT

## 2025-01-10 DIAGNOSIS — I10 ESSENTIAL (PRIMARY) HYPERTENSION: ICD-10-CM

## 2025-04-14 ENCOUNTER — TRANSCRIPTION ENCOUNTER (OUTPATIENT)
Age: 70
End: 2025-04-14

## 2025-05-21 ENCOUNTER — APPOINTMENT (OUTPATIENT)
Dept: INTERNAL MEDICINE | Facility: CLINIC | Age: 70
End: 2025-05-21

## 2025-05-21 ENCOUNTER — OUTPATIENT (OUTPATIENT)
Dept: OUTPATIENT SERVICES | Facility: HOSPITAL | Age: 70
LOS: 1 days | End: 2025-05-21
Payer: MEDICARE

## 2025-05-21 VITALS
DIASTOLIC BLOOD PRESSURE: 70 MMHG | SYSTOLIC BLOOD PRESSURE: 126 MMHG | BODY MASS INDEX: 37.28 KG/M2 | WEIGHT: 246 LBS | HEART RATE: 93 BPM | OXYGEN SATURATION: 97 % | HEIGHT: 68 IN

## 2025-05-21 DIAGNOSIS — I10 ESSENTIAL (PRIMARY) HYPERTENSION: ICD-10-CM

## 2025-05-21 DIAGNOSIS — E11.9 TYPE 2 DIABETES MELLITUS W/OUT COMPLICATIONS: ICD-10-CM

## 2025-05-21 DIAGNOSIS — Z04.9 ENCOUNTER FOR EXAMINATION AND OBSERVATION FOR UNSPECIFIED REASON: ICD-10-CM

## 2025-05-21 DIAGNOSIS — E11.9 TYPE 2 DIABETES MELLITUS WITHOUT COMPLICATIONS: ICD-10-CM

## 2025-05-21 PROCEDURE — 83036 HEMOGLOBIN GLYCOSYLATED A1C: CPT

## 2025-05-21 PROCEDURE — G2211 COMPLEX E/M VISIT ADD ON: CPT

## 2025-05-21 PROCEDURE — 99214 OFFICE O/P EST MOD 30 MIN: CPT

## 2025-05-21 PROCEDURE — G0463: CPT

## 2025-05-21 RX ORDER — MELOXICAM 15 MG/1
15 TABLET ORAL DAILY
Qty: 90 | Refills: 1 | Status: ACTIVE | COMMUNITY
Start: 2025-05-21 | End: 1900-01-01

## 2025-05-25 PROBLEM — Z04.9 OBSERVATION FOR SUSPECTED CONDITION: Status: RESOLVED | Noted: 2024-06-28 | Resolved: 2025-05-25

## 2025-05-25 LAB — HBA1C MFR BLD HPLC: 6.4

## 2025-05-29 ENCOUNTER — NON-APPOINTMENT (OUTPATIENT)
Age: 70
End: 2025-05-29

## 2025-05-29 ENCOUNTER — APPOINTMENT (OUTPATIENT)
Dept: OPHTHALMOLOGY | Facility: CLINIC | Age: 70
End: 2025-05-29
Payer: MEDICARE

## 2025-05-29 PROCEDURE — 92136 OPHTHALMIC BIOMETRY: CPT

## 2025-05-29 PROCEDURE — 92014 COMPRE OPH EXAM EST PT 1/>: CPT

## 2025-05-29 PROCEDURE — 92250 FUNDUS PHOTOGRAPHY W/I&R: CPT

## 2025-06-30 ENCOUNTER — APPOINTMENT (OUTPATIENT)
Dept: INTERNAL MEDICINE | Facility: CLINIC | Age: 70
End: 2025-06-30
Payer: MEDICARE

## 2025-06-30 ENCOUNTER — OUTPATIENT (OUTPATIENT)
Dept: OUTPATIENT SERVICES | Facility: HOSPITAL | Age: 70
LOS: 1 days | End: 2025-06-30
Payer: MEDICARE

## 2025-06-30 VITALS
BODY MASS INDEX: 37.59 KG/M2 | HEART RATE: 86 BPM | OXYGEN SATURATION: 95 % | HEIGHT: 68 IN | SYSTOLIC BLOOD PRESSURE: 140 MMHG | DIASTOLIC BLOOD PRESSURE: 76 MMHG | WEIGHT: 248 LBS

## 2025-06-30 VITALS — DIASTOLIC BLOOD PRESSURE: 74 MMHG | SYSTOLIC BLOOD PRESSURE: 118 MMHG

## 2025-06-30 DIAGNOSIS — I10 ESSENTIAL (PRIMARY) HYPERTENSION: ICD-10-CM

## 2025-06-30 PROBLEM — Z01.818 PRE-OP EVALUATION: Status: ACTIVE | Noted: 2025-06-30

## 2025-06-30 PROCEDURE — G0463: CPT

## 2025-06-30 PROCEDURE — 99214 OFFICE O/P EST MOD 30 MIN: CPT

## 2025-06-30 PROCEDURE — 93010 ELECTROCARDIOGRAM REPORT: CPT

## 2025-07-08 DIAGNOSIS — E11.9 TYPE 2 DIABETES MELLITUS WITHOUT COMPLICATIONS: ICD-10-CM

## 2025-07-08 DIAGNOSIS — J45.991 COUGH VARIANT ASTHMA: ICD-10-CM

## 2025-07-08 DIAGNOSIS — Z01.818 ENCOUNTER FOR OTHER PREPROCEDURAL EXAMINATION: ICD-10-CM

## 2025-07-10 ENCOUNTER — APPOINTMENT (OUTPATIENT)
Dept: OPHTHALMOLOGY | Facility: AMBULATORY SURGERY CENTER | Age: 70
End: 2025-07-10
Payer: MEDICARE

## 2025-07-10 PROCEDURE — 66984 XCAPSL CTRC RMVL W/O ECP: CPT | Mod: LT

## 2025-07-11 ENCOUNTER — NON-APPOINTMENT (OUTPATIENT)
Age: 70
End: 2025-07-11

## 2025-07-11 ENCOUNTER — APPOINTMENT (OUTPATIENT)
Dept: OPHTHALMOLOGY | Facility: CLINIC | Age: 70
End: 2025-07-11
Payer: MEDICARE

## 2025-07-11 PROCEDURE — 99024 POSTOP FOLLOW-UP VISIT: CPT

## 2025-07-15 ENCOUNTER — TRANSCRIPTION ENCOUNTER (OUTPATIENT)
Age: 70
End: 2025-07-15

## 2025-07-17 ENCOUNTER — APPOINTMENT (OUTPATIENT)
Dept: OPHTHALMOLOGY | Facility: CLINIC | Age: 70
End: 2025-07-17
Payer: MEDICARE

## 2025-07-17 ENCOUNTER — NON-APPOINTMENT (OUTPATIENT)
Age: 70
End: 2025-07-17

## 2025-07-17 PROCEDURE — 99024 POSTOP FOLLOW-UP VISIT: CPT

## 2025-07-22 ENCOUNTER — NON-APPOINTMENT (OUTPATIENT)
Age: 70
End: 2025-07-22

## 2025-07-24 ENCOUNTER — APPOINTMENT (OUTPATIENT)
Dept: OTHER | Facility: CLINIC | Age: 70
End: 2025-07-24
Payer: COMMERCIAL

## 2025-07-24 VITALS
RESPIRATION RATE: 17 BRPM | WEIGHT: 248 LBS | HEART RATE: 92 BPM | DIASTOLIC BLOOD PRESSURE: 79 MMHG | OXYGEN SATURATION: 95 % | TEMPERATURE: 97.9 F | HEIGHT: 68 IN | BODY MASS INDEX: 37.59 KG/M2 | SYSTOLIC BLOOD PRESSURE: 128 MMHG

## 2025-07-24 DIAGNOSIS — J45.909 UNSPECIFIED ASTHMA, UNCOMPLICATED: ICD-10-CM

## 2025-07-24 DIAGNOSIS — Z04.9 ENCOUNTER FOR EXAMINATION AND OBSERVATION FOR UNSPECIFIED REASON: ICD-10-CM

## 2025-07-24 DIAGNOSIS — Z12.9 ENCOUNTER FOR SCREENING FOR MALIGNANT NEOPLASM, SITE UNSPECIFIED: ICD-10-CM

## 2025-07-24 PROCEDURE — 94010 BREATHING CAPACITY TEST: CPT

## 2025-07-24 PROCEDURE — 99213 OFFICE O/P EST LOW 20 MIN: CPT | Mod: 25

## 2025-07-24 PROCEDURE — 99397 PER PM REEVAL EST PAT 65+ YR: CPT | Mod: GY

## 2025-07-24 RX ORDER — LEVOCETIRIZINE DIHYDROCHLORIDE 5 MG/1
5 TABLET ORAL
Qty: 90 | Refills: 2 | Status: ACTIVE | COMMUNITY
Start: 2025-07-24 | End: 1900-01-01

## 2025-07-29 LAB
ALBUMIN SERPL ELPH-MCNC: 4.7 G/DL
ALP BLD-CCNC: 54 U/L
ALT SERPL-CCNC: 9 U/L
ANION GAP SERPL CALC-SCNC: 13 MMOL/L
APPEARANCE: CLEAR
AST SERPL-CCNC: 14 U/L
BACTERIA: NEGATIVE /HPF
BILIRUB SERPL-MCNC: 0.5 MG/DL
BILIRUBIN URINE: NEGATIVE
BLOOD URINE: NEGATIVE
BUN SERPL-MCNC: 15 MG/DL
CALCIUM SERPL-MCNC: 9.9 MG/DL
CAST: 0 /LPF
CHLORIDE SERPL-SCNC: 101 MMOL/L
CHOLEST SERPL-MCNC: 181 MG/DL
CO2 SERPL-SCNC: 25 MMOL/L
COLOR: YELLOW
CREAT SERPL-MCNC: 0.76 MG/DL
EGFRCR SERPLBLD CKD-EPI 2021: 86 ML/MIN/1.73M2
EPITHELIAL CELLS: 1 /HPF
GLUCOSE QUALITATIVE U: NEGATIVE MG/DL
GLUCOSE SERPL-MCNC: 71 MG/DL
HCT VFR BLD CALC: 39.3 %
HDLC SERPL-MCNC: 69 MG/DL
HGB BLD-MCNC: 11.7 G/DL
KETONES URINE: NEGATIVE MG/DL
LDLC SERPL-MCNC: 87 MG/DL
LEUKOCYTE ESTERASE URINE: ABNORMAL
MCHC RBC-ENTMCNC: 25.1 PG
MCHC RBC-ENTMCNC: 29.8 G/DL
MCV RBC AUTO: 84.3 FL
MICROSCOPIC-UA: NORMAL
NITRITE URINE: NEGATIVE
NONHDLC SERPL-MCNC: 111 MG/DL
PH URINE: 6
PLATELET # BLD AUTO: 251 K/UL
POTASSIUM SERPL-SCNC: 4.5 MMOL/L
PROT SERPL-MCNC: 7.2 G/DL
PROTEIN URINE: NEGATIVE MG/DL
RBC # BLD: 4.66 M/UL
RBC # FLD: 15.7 %
RED BLOOD CELLS URINE: 1 /HPF
SODIUM SERPL-SCNC: 139 MMOL/L
SPECIFIC GRAVITY URINE: 1.01
TRIGL SERPL-MCNC: 145 MG/DL
UROBILINOGEN URINE: 0.2 MG/DL
WBC # FLD AUTO: 6.28 K/UL
WHITE BLOOD CELLS URINE: 0 /HPF

## 2025-08-21 ENCOUNTER — EMERGENCY (EMERGENCY)
Facility: HOSPITAL | Age: 70
LOS: 1 days | End: 2025-08-21
Attending: EMERGENCY MEDICINE
Payer: MEDICARE

## 2025-08-21 VITALS
RESPIRATION RATE: 19 BRPM | HEART RATE: 118 BPM | TEMPERATURE: 98 F | DIASTOLIC BLOOD PRESSURE: 73 MMHG | SYSTOLIC BLOOD PRESSURE: 118 MMHG | WEIGHT: 253.09 LBS | HEIGHT: 68 IN | OXYGEN SATURATION: 95 %

## 2025-08-21 LAB
ALBUMIN SERPL ELPH-MCNC: 4.4 G/DL — SIGNIFICANT CHANGE UP (ref 3.3–5)
ALP SERPL-CCNC: 57 U/L — SIGNIFICANT CHANGE UP (ref 40–120)
ALT FLD-CCNC: 29 U/L — SIGNIFICANT CHANGE UP (ref 10–45)
ANION GAP SERPL CALC-SCNC: 14 MMOL/L — SIGNIFICANT CHANGE UP (ref 5–17)
AST SERPL-CCNC: 19 U/L — SIGNIFICANT CHANGE UP (ref 10–40)
BASOPHILS # BLD AUTO: 0.04 K/UL — SIGNIFICANT CHANGE UP (ref 0–0.2)
BASOPHILS NFR BLD AUTO: 0.4 % — SIGNIFICANT CHANGE UP (ref 0–2)
BILIRUB SERPL-MCNC: 1 MG/DL — SIGNIFICANT CHANGE UP (ref 0.2–1.2)
BUN SERPL-MCNC: 9 MG/DL — SIGNIFICANT CHANGE UP (ref 7–23)
CALCIUM SERPL-MCNC: 10 MG/DL — SIGNIFICANT CHANGE UP (ref 8.4–10.5)
CHLORIDE SERPL-SCNC: 99 MMOL/L — SIGNIFICANT CHANGE UP (ref 96–108)
CO2 SERPL-SCNC: 25 MMOL/L — SIGNIFICANT CHANGE UP (ref 22–31)
CREAT SERPL-MCNC: 0.76 MG/DL — SIGNIFICANT CHANGE UP (ref 0.5–1.3)
EGFR: 86 ML/MIN/1.73M2 — SIGNIFICANT CHANGE UP
EGFR: 86 ML/MIN/1.73M2 — SIGNIFICANT CHANGE UP
EOSINOPHIL # BLD AUTO: 0.17 K/UL — SIGNIFICANT CHANGE UP (ref 0–0.5)
EOSINOPHIL NFR BLD AUTO: 1.9 % — SIGNIFICANT CHANGE UP (ref 0–6)
GAS PNL BLDV: SIGNIFICANT CHANGE UP
GLUCOSE SERPL-MCNC: 125 MG/DL — HIGH (ref 70–99)
HCT VFR BLD CALC: 38.9 % — SIGNIFICANT CHANGE UP (ref 34.5–45)
HGB BLD-MCNC: 11.7 G/DL — SIGNIFICANT CHANGE UP (ref 11.5–15.5)
IMM GRANULOCYTES # BLD AUTO: 0.04 K/UL — SIGNIFICANT CHANGE UP (ref 0–0.07)
IMM GRANULOCYTES NFR BLD AUTO: 0.4 % — SIGNIFICANT CHANGE UP (ref 0–0.9)
LYMPHOCYTES # BLD AUTO: 1.78 K/UL — SIGNIFICANT CHANGE UP (ref 1–3.3)
LYMPHOCYTES NFR BLD AUTO: 19.9 % — SIGNIFICANT CHANGE UP (ref 13–44)
MCHC RBC-ENTMCNC: 25.4 PG — LOW (ref 27–34)
MCHC RBC-ENTMCNC: 30.1 G/DL — LOW (ref 32–36)
MCV RBC AUTO: 84.4 FL — SIGNIFICANT CHANGE UP (ref 80–100)
MONOCYTES # BLD AUTO: 0.64 K/UL — SIGNIFICANT CHANGE UP (ref 0–0.9)
MONOCYTES NFR BLD AUTO: 7.2 % — SIGNIFICANT CHANGE UP (ref 2–14)
NEUTROPHILS # BLD AUTO: 6.26 K/UL — SIGNIFICANT CHANGE UP (ref 1.8–7.4)
NEUTROPHILS NFR BLD AUTO: 70.2 % — SIGNIFICANT CHANGE UP (ref 43–77)
NRBC # BLD AUTO: 0 K/UL — SIGNIFICANT CHANGE UP (ref 0–0)
NRBC # FLD: 0 K/UL — SIGNIFICANT CHANGE UP (ref 0–0)
NRBC BLD AUTO-RTO: 0 /100 WBCS — SIGNIFICANT CHANGE UP (ref 0–0)
PLATELET # BLD AUTO: 239 K/UL — SIGNIFICANT CHANGE UP (ref 150–400)
PMV BLD: 11 FL — SIGNIFICANT CHANGE UP (ref 7–13)
POTASSIUM SERPL-MCNC: 3.4 MMOL/L — LOW (ref 3.5–5.3)
POTASSIUM SERPL-SCNC: 3.4 MMOL/L — LOW (ref 3.5–5.3)
PROT SERPL-MCNC: 8.1 G/DL — SIGNIFICANT CHANGE UP (ref 6–8.3)
RBC # BLD: 4.61 M/UL — SIGNIFICANT CHANGE UP (ref 3.8–5.2)
RBC # FLD: 15 % — HIGH (ref 10.3–14.5)
SODIUM SERPL-SCNC: 138 MMOL/L — SIGNIFICANT CHANGE UP (ref 135–145)
TROPONIN T, HIGH SENSITIVITY RESULT: 8 NG/L — SIGNIFICANT CHANGE UP
WBC # BLD: 8.93 K/UL — SIGNIFICANT CHANGE UP (ref 3.8–10.5)
WBC # FLD AUTO: 8.93 K/UL — SIGNIFICANT CHANGE UP (ref 3.8–10.5)

## 2025-08-21 PROCEDURE — 71046 X-RAY EXAM CHEST 2 VIEWS: CPT

## 2025-08-21 PROCEDURE — 71275 CT ANGIOGRAPHY CHEST: CPT

## 2025-08-21 PROCEDURE — 99285 EMERGENCY DEPT VISIT HI MDM: CPT | Mod: GC

## 2025-08-21 PROCEDURE — 93010 ELECTROCARDIOGRAM REPORT: CPT

## 2025-08-21 PROCEDURE — 71046 X-RAY EXAM CHEST 2 VIEWS: CPT | Mod: 26

## 2025-08-21 PROCEDURE — 71275 CT ANGIOGRAPHY CHEST: CPT | Mod: 26

## 2025-08-21 RX ORDER — SODIUM CHLORIDE 9 G/1000ML
1000 INJECTION, SOLUTION INTRAVENOUS ONCE
Refills: 0 | Status: COMPLETED | OUTPATIENT
Start: 2025-08-21 | End: 2025-08-21

## 2025-08-21 RX ORDER — KETOROLAC TROMETHAMINE 30 MG/ML
15 INJECTION, SOLUTION INTRAMUSCULAR; INTRAVENOUS ONCE
Refills: 0 | Status: DISCONTINUED | OUTPATIENT
Start: 2025-08-21 | End: 2025-08-21

## 2025-08-21 RX ADMIN — SODIUM CHLORIDE 1000 MILLILITER(S): 9 INJECTION, SOLUTION INTRAVENOUS at 23:18

## 2025-08-21 RX ADMIN — KETOROLAC TROMETHAMINE 15 MILLIGRAM(S): 30 INJECTION, SOLUTION INTRAMUSCULAR; INTRAVENOUS at 23:19

## 2025-08-22 ENCOUNTER — APPOINTMENT (OUTPATIENT)
Dept: PULMONOLOGY | Facility: CLINIC | Age: 70
End: 2025-08-22

## 2025-08-22 ENCOUNTER — RX RENEWAL (OUTPATIENT)
Age: 70
End: 2025-08-22

## 2025-08-22 VITALS
RESPIRATION RATE: 17 BRPM | TEMPERATURE: 98 F | SYSTOLIC BLOOD PRESSURE: 127 MMHG | HEIGHT: 68 IN | WEIGHT: 248 LBS | DIASTOLIC BLOOD PRESSURE: 85 MMHG | BODY MASS INDEX: 37.59 KG/M2 | HEART RATE: 96 BPM | OXYGEN SATURATION: 93 %

## 2025-08-22 VITALS
SYSTOLIC BLOOD PRESSURE: 132 MMHG | TEMPERATURE: 98 F | HEART RATE: 69 BPM | RESPIRATION RATE: 20 BRPM | OXYGEN SATURATION: 98 % | DIASTOLIC BLOOD PRESSURE: 76 MMHG

## 2025-08-22 DIAGNOSIS — J18.9 PNEUMONIA, UNSPECIFIED ORGANISM: ICD-10-CM

## 2025-08-22 LAB
FLUAV AG NPH QL: SIGNIFICANT CHANGE UP
FLUBV AG NPH QL: SIGNIFICANT CHANGE UP
RSV RNA NPH QL NAA+NON-PROBE: SIGNIFICANT CHANGE UP
SARS-COV-2 RNA SPEC QL NAA+PROBE: SIGNIFICANT CHANGE UP
SOURCE RESPIRATORY: SIGNIFICANT CHANGE UP

## 2025-08-22 PROCEDURE — 84295 ASSAY OF SERUM SODIUM: CPT

## 2025-08-22 PROCEDURE — 82330 ASSAY OF CALCIUM: CPT

## 2025-08-22 PROCEDURE — 82947 ASSAY GLUCOSE BLOOD QUANT: CPT

## 2025-08-22 PROCEDURE — 80053 COMPREHEN METABOLIC PANEL: CPT

## 2025-08-22 PROCEDURE — 99214 OFFICE O/P EST MOD 30 MIN: CPT

## 2025-08-22 PROCEDURE — 36415 COLL VENOUS BLD VENIPUNCTURE: CPT

## 2025-08-22 PROCEDURE — 82803 BLOOD GASES ANY COMBINATION: CPT

## 2025-08-22 PROCEDURE — 87637 SARSCOV2&INF A&B&RSV AMP PRB: CPT

## 2025-08-22 PROCEDURE — 83605 ASSAY OF LACTIC ACID: CPT

## 2025-08-22 PROCEDURE — 96374 THER/PROPH/DIAG INJ IV PUSH: CPT | Mod: XU

## 2025-08-22 PROCEDURE — 83880 ASSAY OF NATRIURETIC PEPTIDE: CPT

## 2025-08-22 PROCEDURE — 85014 HEMATOCRIT: CPT

## 2025-08-22 PROCEDURE — 93005 ELECTROCARDIOGRAM TRACING: CPT

## 2025-08-22 PROCEDURE — 82435 ASSAY OF BLOOD CHLORIDE: CPT

## 2025-08-22 PROCEDURE — 99285 EMERGENCY DEPT VISIT HI MDM: CPT | Mod: 25

## 2025-08-22 PROCEDURE — 85025 COMPLETE CBC W/AUTO DIFF WBC: CPT

## 2025-08-22 PROCEDURE — 84484 ASSAY OF TROPONIN QUANT: CPT

## 2025-08-22 PROCEDURE — 71046 X-RAY EXAM CHEST 2 VIEWS: CPT

## 2025-08-22 PROCEDURE — 84132 ASSAY OF SERUM POTASSIUM: CPT

## 2025-08-22 PROCEDURE — 71275 CT ANGIOGRAPHY CHEST: CPT

## 2025-08-22 PROCEDURE — 85018 HEMOGLOBIN: CPT

## 2025-08-22 RX ORDER — SODIUM CHLORIDE FOR INHALATION 0.9 %
0.9 VIAL, NEBULIZER (ML) INHALATION TWICE DAILY
Qty: 1 | Refills: 2 | Status: ACTIVE | COMMUNITY
Start: 2025-08-22 | End: 1900-01-01

## 2025-08-22 RX ORDER — AMOXICILLIN AND CLAVULANATE POTASSIUM 500; 125 MG/1; MG/1
1 TABLET, FILM COATED ORAL ONCE
Refills: 0 | Status: COMPLETED | OUTPATIENT
Start: 2025-08-22 | End: 2025-08-22

## 2025-08-22 RX ORDER — AMOXICILLIN AND CLAVULANATE POTASSIUM 500; 125 MG/1; MG/1
1 TABLET, FILM COATED ORAL
Qty: 14 | Refills: 0
Start: 2025-08-22 | End: 2025-08-28

## 2025-08-22 RX ORDER — BUDESONIDE AND FORMOTEROL FUMARATE 160; 4.5 UG/1; UG/1
160-4.5 AEROSOL, METERED RESPIRATORY (INHALATION) TWICE DAILY
Qty: 30.9 | Refills: 0 | Status: ACTIVE | COMMUNITY
Start: 2025-08-22 | End: 1900-01-01

## 2025-08-22 RX ADMIN — AMOXICILLIN AND CLAVULANATE POTASSIUM 1 TABLET(S): 500; 125 TABLET, FILM COATED ORAL at 02:01

## 2025-08-27 ENCOUNTER — NON-APPOINTMENT (OUTPATIENT)
Age: 70
End: 2025-08-27

## 2025-08-27 ENCOUNTER — APPOINTMENT (OUTPATIENT)
Dept: OPHTHALMOLOGY | Facility: CLINIC | Age: 70
End: 2025-08-27
Payer: MEDICARE

## 2025-08-27 PROCEDURE — 99024 POSTOP FOLLOW-UP VISIT: CPT

## 2025-09-10 ENCOUNTER — NON-APPOINTMENT (OUTPATIENT)
Age: 70
End: 2025-09-10

## 2025-09-10 ENCOUNTER — APPOINTMENT (OUTPATIENT)
Dept: OPHTHALMOLOGY | Facility: CLINIC | Age: 70
End: 2025-09-10
Payer: MEDICARE

## 2025-09-10 PROCEDURE — 99024 POSTOP FOLLOW-UP VISIT: CPT

## 2025-09-12 ENCOUNTER — APPOINTMENT (OUTPATIENT)
Dept: INTERNAL MEDICINE | Facility: CLINIC | Age: 70
End: 2025-09-12
Payer: MEDICARE

## 2025-09-12 VITALS
DIASTOLIC BLOOD PRESSURE: 70 MMHG | OXYGEN SATURATION: 98 % | HEIGHT: 68 IN | HEART RATE: 85 BPM | BODY MASS INDEX: 37.28 KG/M2 | SYSTOLIC BLOOD PRESSURE: 120 MMHG | WEIGHT: 246 LBS

## 2025-09-12 DIAGNOSIS — J45.909 UNSPECIFIED ASTHMA, UNCOMPLICATED: ICD-10-CM

## 2025-09-12 DIAGNOSIS — J18.9 PNEUMONIA, UNSPECIFIED ORGANISM: ICD-10-CM

## 2025-09-12 DIAGNOSIS — E11.9 TYPE 2 DIABETES MELLITUS W/OUT COMPLICATIONS: ICD-10-CM

## 2025-09-12 DIAGNOSIS — Z01.818 ENCOUNTER FOR OTHER PREPROCEDURAL EXAMINATION: ICD-10-CM

## 2025-09-12 DIAGNOSIS — M17.9 OSTEOARTHRITIS OF KNEE, UNSPECIFIED: ICD-10-CM

## 2025-09-12 DIAGNOSIS — E66.9 OBESITY, UNSPECIFIED: ICD-10-CM

## 2025-09-12 LAB — HBA1C MFR BLD HPLC: 6.6

## 2025-09-12 PROCEDURE — G2211 COMPLEX E/M VISIT ADD ON: CPT

## 2025-09-12 PROCEDURE — 99214 OFFICE O/P EST MOD 30 MIN: CPT
